# Patient Record
Sex: FEMALE | Race: BLACK OR AFRICAN AMERICAN | NOT HISPANIC OR LATINO | Employment: UNEMPLOYED | URBAN - METROPOLITAN AREA
[De-identification: names, ages, dates, MRNs, and addresses within clinical notes are randomized per-mention and may not be internally consistent; named-entity substitution may affect disease eponyms.]

---

## 2017-04-04 ENCOUNTER — HOSPITAL ENCOUNTER (EMERGENCY)
Facility: HOSPITAL | Age: 32
Discharge: HOME | End: 2017-04-04
Attending: EMERGENCY MEDICINE
Payer: MEDICAID

## 2017-04-04 VITALS
TEMPERATURE: 100 F | OXYGEN SATURATION: 99 % | RESPIRATION RATE: 20 BRPM | SYSTOLIC BLOOD PRESSURE: 105 MMHG | HEART RATE: 100 BPM | DIASTOLIC BLOOD PRESSURE: 62 MMHG

## 2017-04-04 DIAGNOSIS — K52.9 GASTROENTERITIS: Primary | ICD-10-CM

## 2017-04-04 LAB
B-HCG UR QL: NEGATIVE
BILIRUB UR QL STRIP: NEGATIVE
CLARITY UR: CLEAR
COLOR UR: YELLOW
CTP QC/QA: YES
DEPRECATED S PYO AG THROAT QL EIA: NEGATIVE
FLUAV AG SPEC QL IA: NEGATIVE
FLUBV AG SPEC QL IA: NEGATIVE
GLUCOSE UR QL STRIP: NEGATIVE
HGB UR QL STRIP: ABNORMAL
KETONES UR QL STRIP: NEGATIVE
LEUKOCYTE ESTERASE UR QL STRIP: NEGATIVE
NITRITE UR QL STRIP: NEGATIVE
PH UR STRIP: 8 [PH] (ref 5–8)
PROT UR QL STRIP: NEGATIVE
SP GR UR STRIP: 1.02 (ref 1–1.03)
SPECIMEN SOURCE: NORMAL
URN SPEC COLLECT METH UR: ABNORMAL
UROBILINOGEN UR STRIP-ACNC: 1 EU/DL

## 2017-04-04 PROCEDURE — 25000003 PHARM REV CODE 250: Performed by: PHYSICIAN ASSISTANT

## 2017-04-04 PROCEDURE — 25000003 PHARM REV CODE 250: Performed by: EMERGENCY MEDICINE

## 2017-04-04 PROCEDURE — 87880 STREP A ASSAY W/OPTIC: CPT

## 2017-04-04 PROCEDURE — 87081 CULTURE SCREEN ONLY: CPT

## 2017-04-04 PROCEDURE — 63600175 PHARM REV CODE 636 W HCPCS: Performed by: EMERGENCY MEDICINE

## 2017-04-04 PROCEDURE — 96374 THER/PROPH/DIAG INJ IV PUSH: CPT

## 2017-04-04 PROCEDURE — 96372 THER/PROPH/DIAG INJ SC/IM: CPT

## 2017-04-04 PROCEDURE — 99284 EMERGENCY DEPT VISIT MOD MDM: CPT | Mod: 25

## 2017-04-04 PROCEDURE — 87400 INFLUENZA A/B EACH AG IA: CPT

## 2017-04-04 PROCEDURE — 87147 CULTURE TYPE IMMUNOLOGIC: CPT | Mod: 59

## 2017-04-04 PROCEDURE — 81003 URINALYSIS AUTO W/O SCOPE: CPT

## 2017-04-04 RX ORDER — ONDANSETRON 4 MG/1
4 TABLET, ORALLY DISINTEGRATING ORAL
Status: COMPLETED | OUTPATIENT
Start: 2017-04-04 | End: 2017-04-04

## 2017-04-04 RX ORDER — IBUPROFEN 400 MG/1
800 TABLET ORAL
Status: DISCONTINUED | OUTPATIENT
Start: 2017-04-04 | End: 2017-04-04

## 2017-04-04 RX ORDER — ACETAMINOPHEN 500 MG
1000 TABLET ORAL
Status: COMPLETED | OUTPATIENT
Start: 2017-04-04 | End: 2017-04-04

## 2017-04-04 RX ORDER — PROCHLORPERAZINE EDISYLATE 5 MG/ML
10 INJECTION INTRAMUSCULAR; INTRAVENOUS
Status: COMPLETED | OUTPATIENT
Start: 2017-04-04 | End: 2017-04-04

## 2017-04-04 RX ORDER — KETOROLAC TROMETHAMINE 30 MG/ML
15 INJECTION, SOLUTION INTRAMUSCULAR; INTRAVENOUS
Status: COMPLETED | OUTPATIENT
Start: 2017-04-04 | End: 2017-04-04

## 2017-04-04 RX ORDER — ONDANSETRON 4 MG/1
4 TABLET, ORALLY DISINTEGRATING ORAL EVERY 6 HOURS PRN
Qty: 15 TABLET | Refills: 0 | Status: SHIPPED | OUTPATIENT
Start: 2017-04-04

## 2017-04-04 RX ADMIN — ACETAMINOPHEN 1000 MG: 500 TABLET ORAL at 05:04

## 2017-04-04 RX ADMIN — KETOROLAC TROMETHAMINE 15 MG: 30 INJECTION, SOLUTION INTRAMUSCULAR at 04:04

## 2017-04-04 RX ADMIN — PROCHLORPERAZINE EDISYLATE 10 MG: 5 INJECTION INTRAMUSCULAR; INTRAVENOUS at 04:04

## 2017-04-04 RX ADMIN — ONDANSETRON 4 MG: 4 TABLET, ORALLY DISINTEGRATING ORAL at 04:04

## 2017-04-04 NOTE — ED AVS SNAPSHOT
[facility] [address]  [place] [address]               Nicolasa Hernandez   2017  3:57 PM   ED    Description:  Female : 1985   Department:  [facility]           Your Care was Coordinated By:     Provider Role From To    Olegario Angel MD Attending Provider 17 [id number] --    Korin Lorenz PA-C Physician Assistant 17 1341 --      Reason for Visit     Abdominal Pain     Sore Throat     Generalized Body Aches           Diagnoses this Visit        Comments    Gastroenteritis    -  Primary       ED Disposition     None           To Do List           Follow-up Information     Follow up with [facility] In 3 days.    Specialties:  Child and Adolescent Psychiatry, Psychology, Family Medicine, Obstetrics    Contact information:    [address]  [address]  [place] [address]  [phone]          Please follow up.    Why:  [phone] -  [facility] appointment line        These Medications        Disp Refills Start End    ondansetron (ZOFRAN-ODT) 4 MG TbDL 15 tablet 0 2017     Take 1 tablet (4 mg total) by mouth every 6 (six) hours as needed. - Oral    Pharmacy: [facility] [place] [address] AT [facility] #: [phone]         [facility]     [facility] Nurse Care Line -  Assistance  Unless otherwise directed by your provider, please contact [facility], our nurse care line that is available for  assistance.     Registered nurses in the [facility] provide: appointment scheduling, clinical advisement, health education, and other advisory services.  Call: [phone] (toll free)               Medications           Message regarding Medications     Verify the changes and/or additions to your medication regime listed below are the same as discussed with your clinician today.  If any of these changes or  additions are incorrect, please notify your healthcare provider.        START taking these NEW medications        Refills    ondansetron (ZOFRAN-ODT) 4 MG TbDL 0    Sig: Take 1 tablet (4 mg total) by mouth every 6 (six) hours as needed.    Class: Print    Route: Oral      These medications were administered today        Dose Freq    ondansetron disintegrating tablet 4 mg 4 mg ED 1 Time    Sig: Take 1 tablet (4 mg total) by mouth ED 1 Time.    Class: Normal    Route: Oral    ketorolac injection 15 mg 15 mg ED 1 Time    Sig: Inject 15 mg into the vein ED 1 Time.    Class: Normal    Route: Intravenous    prochlorperazine injection Soln 10 mg 10 mg ED 1 Time    Sig: Inject 2 mLs (10 mg total) into the muscle ED 1 Time.    Class: Normal    Route: Intramuscular    acetaminophen tablet 1,000 mg 1,000 mg ED 1 Time    Sig: Take 2 tablets (1,000 mg total) by mouth ED 1 Time.    Class: Normal    Route: Oral    Cosign for Ordering: Required by Olegario Angel MD           Verify that the below list of medications is an accurate representation of the medications you are currently taking.  If none reported, the list may be blank. If incorrect, please contact your healthcare provider. Carry this list with you in case of emergency.           Current Medications     ibuprofen (ADVIL,MOTRIN) 200 MG tablet Take 2 tablets (400 mg total) by mouth every 6 (six) hours as needed for Pain.    ibuprofen (ADVIL,MOTRIN) 800 MG tablet Take 1 tablet (800 mg total) by mouth 3 (three) times daily.    mv-min-FA-Af-Au-zbbujrt-lutein (CENTRUM) 0.4-162-18 mg Tab Take by mouth.    ondansetron (ZOFRAN-ODT) 4 MG TbDL Take 1 tablet (4 mg total) by mouth every 6 (six) hours as needed.    oxycodone-acetaminophen (PERCOCET) 5-325 mg per tablet Take 1 tablet by mouth every 6 (six) hours as needed for Pain.    polyethylene glycol (GLYCOLAX) 17 gram/dose powder            Clinical Reference Information           Your Vitals Were     BP Pulse Temp Resp SpO2        128/72 (BP Location: Left arm, Patient Position: Sitting, BP Method: Automatic) 103 103.1 °F (39.5 °C) 20 100%       Allergies as of 4/4/2017        Reactions    Morphine Swelling      Immunizations Administered on Date of Encounter - 4/4/2017     None      ED Micro, Lab, POCT     Start Ordered       Status Ordering Provider    04/04/17 1600 04/04/17 1600  POCT urine pregnancy  Once      Acknowledged     04/04/17 1600 04/04/17 1600  Urinalysis Clean Catch  STAT      Final result     04/04/17 1600 04/04/17 1600  Rapid strep screen  STAT      Final result     04/04/17 1600 04/04/17 1600  Influenza antigen Nasal Swab  STAT      Final result     04/04/17 1600 04/04/17 1600  Strep A culture, throat  Once      In process     04/04/17 0000 04/04/17 1558  POCT urine pregnancy     Comments:  This order was created through External Result Entry    Completed       ED Imaging Orders     None      Discharge References/Attachments     VOMITING OR DIARRHEA (ADULT), DIET FOR (ENGLISH)      [facility] Sign-Up     Activating your [facility] account is as easy as 1-2-3!     1) Visit [email or web address], select Sign Up Now, enter this activation code and your date of birth, then select Next.  [email or web address]  Expires: 5/19/2017  5:29 PM      2) Create a username and password to use when you visit [facility] in the future and select a security question in case you lose your password and select Next.    3) Enter your e-mail address and click Sign Up!    Additional Information  If you have questions, please e-mail [email or web address] or call [phone] to talk to our [facility] staff. Remember, [facility] is NOT to be used for urgent needs. For medical emergencies, dial 911.          [facility] complies with applicable Federal civil rights laws and does not discriminate on the basis of race, color, national origin, age, disability, or sex.        Language Assistance Services     ATTENTION: Language assistance services  are available, free of charge. Please call [phone].      ATENCIÓN: Si habla español, tiene a fernandes disposición servicios gratuitos de asistencia lingüística. Llame al [phone].     CHÚ Ý: N?u b?n nói Ti?ng Vi?t, có các d?ch v? h? tr? ngôn ng? mi?n phí dành cho b?n. G?i s? [phone].

## 2017-04-04 NOTE — ED PROVIDER NOTES
Encounter Date: 2017       History     Chief Complaint   Patient presents with    Abdominal Pain     abd pain x 2 days + nausea     Sore Throat     with reported fever on 100.6     Generalized Body Aches     Review of patient's allergies indicates:   Allergen Reactions    Morphine Swelling     HPI Comments: Nicolasa Hernandez, a 31 y.o. female that presents to the ED for abdominal pain, nausea and vomiting x 2, sore throat and generalized body aches x 1 day.  Treatments tried include tylenol cold and flu early today with no improvement of symptoms.  She denies sick contacts.  Reported fever at home is 100.6.  Denies any diarrhea or blood in vomitus.  LBM was yesterday.  Only abdominal surgery is .        The history is provided by the patient.     Past Medical History:   Diagnosis Date    Endometriosis     Ovarian cyst      Past Surgical History:   Procedure Laterality Date     SECTION, CLASSIC      x2    OTHER SURGICAL HISTORY  tubes/ clipped    TUBAL LIGATION       Family History   Problem Relation Age of Onset    Cancer Mother      breast cancer?    Hypertension Other     Cancer Other     Diabetes Other      Social History   Substance Use Topics    Smoking status: Never Smoker    Smokeless tobacco: Never Used    Alcohol use Yes      Comment: occasional     Review of Systems   Constitutional: Positive for appetite change and fever.   HENT: Positive for sore throat. Negative for trouble swallowing and voice change.    Gastrointestinal: Positive for abdominal pain, nausea and vomiting. Negative for blood in stool, constipation and diarrhea.   Genitourinary: Negative for dysuria.   Skin: Negative for color change and rash.   Allergic/Immunologic: Negative for immunocompromised state.   Neurological: Positive for headaches. Negative for speech difficulty and numbness.   Psychiatric/Behavioral: Negative for agitation and confusion.   All other systems reviewed and are  negative.      Physical Exam   Initial Vitals   BP Pulse Resp Temp SpO2   04/04/17 1445 04/04/17 1445 -- 04/04/17 1445 04/04/17 1445   148/62 104  100.2 °F (37.9 °C) 100 %     Physical Exam    Nursing note and vitals reviewed.  Constitutional: She appears well-developed and well-nourished. No distress.   HENT:   Head: Normocephalic and atraumatic.   Right Ear: Hearing, tympanic membrane, external ear and ear canal normal.   Left Ear: Hearing, tympanic membrane, external ear and ear canal normal.   Mouth/Throat: Uvula is midline, oropharynx is clear and moist and mucous membranes are normal.   Eyes: Conjunctivae and EOM are normal.   Neck: Normal range of motion. No tracheal deviation present.   Cardiovascular: Normal rate and regular rhythm.   Pulmonary/Chest: Breath sounds normal. No respiratory distress. She has no wheezes. She has no rhonchi. She has no rales.   Abdominal: Soft. Bowel sounds are normal. She exhibits no distension. There is generalized tenderness. There is no rigidity, no rebound, no guarding, no CVA tenderness, no tenderness at McBurney's point and negative Villalba's sign.   Musculoskeletal: Normal range of motion. She exhibits no tenderness.   Neurological: She is alert and oriented to person, place, and time. She has normal strength.   Skin: Skin is warm and dry. No rash noted. No erythema.   Psychiatric: She has a normal mood and affect. Thought content normal.         ED Course   Procedures  Labs Reviewed   URINALYSIS - Abnormal; Notable for the following:        Result Value    Occult Blood UA Trace (*)     All other components within normal limits   THROAT SCREEN, RAPID   CULTURE, STREP A,  THROAT   INFLUENZA A AND B ANTIGEN   POCT URINE PREGNANCY             Medical Decision Making:   Initial Assessment:   Abdominal pain, N/V, generalized body aches, sore throat, headache  Differential Diagnosis:   Gastroenteritis, influenza, strept, viral URI   Clinical Tests:   Lab Tests: Ordered and  Reviewed  The following lab test(s) were unremarkable: Urinalysis and UPT  ED Management:  Zofran, toradol and compazine given in ED with improvement of body aches, headache and nausea.  PO challenge passed.  Influenza and strept negative.  UA showed no evidence of UTI.  Fever reached 103, tylenol given with reduction of fever.  Patient was encouraged to increase hydration. She was given information on fever control with tylenol and motrin as well as advancing diet.  Strict return precautions given and patient verbalized understanding.    RX: Zofran                 Attending Attestation:     Physician Attestation Statement for NP/PA:   I have conducted a face to face encounter with this patient in addition to the NP/PA, due to NP/PA Request    Other NP/PA Attestation Additions:    History of Present Illness: Agree; 31-year-old female presents to the emergency department complaining of abdominal pain, nausea, 2 episodes of nonbloody, nonbilious emesis, sore throat, myalgias.  No relief with Tylenol Cold and flu, MAXIMUM TEMPERATURE at home 100.6.  No diarrhea.   Physical Exam: Agree   Medical Decision Making: Agree; patient given symptomatically treatment and feeling much better at this time.  Discussed disposition including discharge with symptomatically management, follow up with primary care physician, reasons return to the emergency room.                 ED Course     Clinical Impression:   The encounter diagnosis was Gastroenteritis.          Korin Lorenz PA-C  04/04/17 1801       Olegario Angel MD  04/14/17 3703

## 2017-04-05 LAB
BACTERIA THROAT CULT: NORMAL
BACTERIA THROAT CULT: NORMAL

## 2019-07-15 ENCOUNTER — HOSPITAL ENCOUNTER (EMERGENCY)
Facility: HOSPITAL | Age: 34
Discharge: HOME | End: 2019-07-16
Attending: EMERGENCY MEDICINE

## 2019-07-15 DIAGNOSIS — R11.0 NAUSEA: Primary | ICD-10-CM

## 2019-07-15 DIAGNOSIS — R07.9 CHEST PAIN: ICD-10-CM

## 2019-07-15 LAB
B-HCG UR QL: NEGATIVE
BACTERIA #/AREA URNS AUTO: NORMAL /HPF
BILIRUB UR QL STRIP: NEGATIVE
CLARITY UR REFRACT.AUTO: CLEAR
COLOR UR AUTO: ABNORMAL
CTP QC/QA: YES
GLUCOSE UR QL STRIP: NEGATIVE
HGB UR QL STRIP: ABNORMAL
KETONES UR QL STRIP: NEGATIVE
LEUKOCYTE ESTERASE UR QL STRIP: NEGATIVE
MICROSCOPIC COMMENT: NORMAL
NITRITE UR QL STRIP: NEGATIVE
PH UR STRIP: 8 [PH] (ref 5–8)
PROT UR QL STRIP: NEGATIVE
RBC #/AREA URNS AUTO: 4 /HPF (ref 0–4)
SP GR UR STRIP: 1.01 (ref 1–1.03)
SQUAMOUS #/AREA URNS AUTO: 1 /HPF
URN SPEC COLLECT METH UR: ABNORMAL
WBC #/AREA URNS AUTO: 0 /HPF (ref 0–5)

## 2019-07-15 PROCEDURE — 99284 EMERGENCY DEPT VISIT MOD MDM: CPT | Mod: 25

## 2019-07-15 PROCEDURE — 81001 URINALYSIS AUTO W/SCOPE: CPT

## 2019-07-15 PROCEDURE — 99284 EMERGENCY DEPT VISIT MOD MDM: CPT | Mod: ,,, | Performed by: EMERGENCY MEDICINE

## 2019-07-15 PROCEDURE — 96361 HYDRATE IV INFUSION ADD-ON: CPT

## 2019-07-15 PROCEDURE — 85025 COMPLETE CBC W/AUTO DIFF WBC: CPT

## 2019-07-15 PROCEDURE — 81025 URINE PREGNANCY TEST: CPT | Performed by: EMERGENCY MEDICINE

## 2019-07-15 PROCEDURE — 93005 ELECTROCARDIOGRAM TRACING: CPT

## 2019-07-15 PROCEDURE — 63600175 PHARM REV CODE 636 W HCPCS: Performed by: EMERGENCY MEDICINE

## 2019-07-15 PROCEDURE — 93010 EKG 12-LEAD: ICD-10-PCS | Mod: ,,, | Performed by: INTERNAL MEDICINE

## 2019-07-15 PROCEDURE — 99284 PR EMERGENCY DEPT VISIT,LEVEL IV: ICD-10-PCS | Mod: ,,, | Performed by: EMERGENCY MEDICINE

## 2019-07-15 PROCEDURE — 96372 THER/PROPH/DIAG INJ SC/IM: CPT

## 2019-07-15 PROCEDURE — 25000003 PHARM REV CODE 250: Performed by: EMERGENCY MEDICINE

## 2019-07-15 PROCEDURE — 96374 THER/PROPH/DIAG INJ IV PUSH: CPT

## 2019-07-15 PROCEDURE — 83690 ASSAY OF LIPASE: CPT

## 2019-07-15 PROCEDURE — 80053 COMPREHEN METABOLIC PANEL: CPT

## 2019-07-15 PROCEDURE — 93010 ELECTROCARDIOGRAM REPORT: CPT | Mod: ,,, | Performed by: INTERNAL MEDICINE

## 2019-07-15 RX ORDER — ONDANSETRON 2 MG/ML
8 INJECTION INTRAMUSCULAR; INTRAVENOUS
Status: COMPLETED | OUTPATIENT
Start: 2019-07-15 | End: 2019-07-15

## 2019-07-15 RX ORDER — ONDANSETRON 4 MG/1
4 TABLET, ORALLY DISINTEGRATING ORAL
Status: COMPLETED | OUTPATIENT
Start: 2019-07-15 | End: 2019-07-15

## 2019-07-15 RX ORDER — MAG HYDROX/ALUMINUM HYD/SIMETH 200-200-20
5 SUSPENSION, ORAL (FINAL DOSE FORM) ORAL
Status: COMPLETED | OUTPATIENT
Start: 2019-07-15 | End: 2019-07-15

## 2019-07-15 RX ORDER — DICYCLOMINE HYDROCHLORIDE 10 MG/1
20 CAPSULE ORAL
Status: COMPLETED | OUTPATIENT
Start: 2019-07-15 | End: 2019-07-15

## 2019-07-15 RX ADMIN — ONDANSETRON 4 MG: 4 TABLET, ORALLY DISINTEGRATING ORAL at 10:07

## 2019-07-15 RX ADMIN — SODIUM CHLORIDE 1000 ML: 0.9 INJECTION, SOLUTION INTRAVENOUS at 11:07

## 2019-07-15 RX ADMIN — DICYCLOMINE HYDROCHLORIDE 20 MG: 10 CAPSULE ORAL at 10:07

## 2019-07-15 RX ADMIN — ONDANSETRON 8 MG: 2 INJECTION INTRAMUSCULAR; INTRAVENOUS at 11:07

## 2019-07-15 RX ADMIN — ALUMINUM HYDROXIDE, MAGNESIUM HYDROXIDE, AND SIMETHICONE 5 ML: 200; 200; 20 SUSPENSION ORAL at 10:07

## 2019-07-16 VITALS
BODY MASS INDEX: 36.96 KG/M2 | OXYGEN SATURATION: 99 % | DIASTOLIC BLOOD PRESSURE: 73 MMHG | HEIGHT: 66 IN | WEIGHT: 230 LBS | HEART RATE: 67 BPM | RESPIRATION RATE: 22 BRPM | TEMPERATURE: 99 F | SYSTOLIC BLOOD PRESSURE: 132 MMHG

## 2019-07-16 LAB
ALBUMIN SERPL BCP-MCNC: 3.8 G/DL (ref 3.5–5.2)
ALP SERPL-CCNC: 59 U/L (ref 55–135)
ALT SERPL W/O P-5'-P-CCNC: 21 U/L (ref 10–44)
ANION GAP SERPL CALC-SCNC: 6 MMOL/L (ref 8–16)
AST SERPL-CCNC: 25 U/L (ref 10–40)
BASOPHILS # BLD AUTO: 0.03 K/UL (ref 0–0.2)
BASOPHILS NFR BLD: 0.4 % (ref 0–1.9)
BILIRUB SERPL-MCNC: 0.4 MG/DL (ref 0.1–1)
BUN SERPL-MCNC: 7 MG/DL (ref 6–20)
CALCIUM SERPL-MCNC: 9.6 MG/DL (ref 8.7–10.5)
CHLORIDE SERPL-SCNC: 104 MMOL/L (ref 95–110)
CO2 SERPL-SCNC: 29 MMOL/L (ref 23–29)
CREAT SERPL-MCNC: 0.9 MG/DL (ref 0.5–1.4)
DIFFERENTIAL METHOD: ABNORMAL
EOSINOPHIL # BLD AUTO: 0.1 K/UL (ref 0–0.5)
EOSINOPHIL NFR BLD: 0.8 % (ref 0–8)
ERYTHROCYTE [DISTWIDTH] IN BLOOD BY AUTOMATED COUNT: 13.8 % (ref 11.5–14.5)
EST. GFR  (AFRICAN AMERICAN): >60 ML/MIN/1.73 M^2
EST. GFR  (NON AFRICAN AMERICAN): >60 ML/MIN/1.73 M^2
GLUCOSE SERPL-MCNC: 102 MG/DL (ref 70–110)
HCT VFR BLD AUTO: 31.8 % (ref 37–48.5)
HGB BLD-MCNC: 10.4 G/DL (ref 12–16)
IMM GRANULOCYTES # BLD AUTO: 0.02 K/UL (ref 0–0.04)
IMM GRANULOCYTES NFR BLD AUTO: 0.3 % (ref 0–0.5)
LIPASE SERPL-CCNC: 18 U/L (ref 4–60)
LYMPHOCYTES # BLD AUTO: 2.7 K/UL (ref 1–4.8)
LYMPHOCYTES NFR BLD: 33.8 % (ref 18–48)
MCH RBC QN AUTO: 25.6 PG (ref 27–31)
MCHC RBC AUTO-ENTMCNC: 32.7 G/DL (ref 32–36)
MCV RBC AUTO: 78 FL (ref 82–98)
MONOCYTES # BLD AUTO: 0.5 K/UL (ref 0.3–1)
MONOCYTES NFR BLD: 6 % (ref 4–15)
NEUTROPHILS # BLD AUTO: 4.7 K/UL (ref 1.8–7.7)
NEUTROPHILS NFR BLD: 58.7 % (ref 38–73)
NRBC BLD-RTO: 0 /100 WBC
PLATELET # BLD AUTO: 264 K/UL (ref 150–350)
PMV BLD AUTO: 11.1 FL (ref 9.2–12.9)
POTASSIUM SERPL-SCNC: 3.5 MMOL/L (ref 3.5–5.1)
PROT SERPL-MCNC: 7.7 G/DL (ref 6–8.4)
RBC # BLD AUTO: 4.07 M/UL (ref 4–5.4)
SODIUM SERPL-SCNC: 139 MMOL/L (ref 136–145)
WBC # BLD AUTO: 7.97 K/UL (ref 3.9–12.7)

## 2019-07-16 PROCEDURE — 25000003 PHARM REV CODE 250: Performed by: EMERGENCY MEDICINE

## 2019-07-16 PROCEDURE — S0028 INJECTION, FAMOTIDINE, 20 MG: HCPCS | Performed by: EMERGENCY MEDICINE

## 2019-07-16 RX ORDER — DICYCLOMINE HYDROCHLORIDE 10 MG/1
20 CAPSULE ORAL
Status: COMPLETED | OUTPATIENT
Start: 2019-07-16 | End: 2019-07-16

## 2019-07-16 RX ORDER — FAMOTIDINE 10 MG/ML
20 INJECTION INTRAVENOUS
Status: COMPLETED | OUTPATIENT
Start: 2019-07-16 | End: 2019-07-16

## 2019-07-16 RX ORDER — MAG HYDROX/ALUMINUM HYD/SIMETH 200-200-20
5 SUSPENSION, ORAL (FINAL DOSE FORM) ORAL
Status: COMPLETED | OUTPATIENT
Start: 2019-07-16 | End: 2019-07-16

## 2019-07-16 RX ADMIN — ALUMINUM HYDROXIDE, MAGNESIUM HYDROXIDE, AND SIMETHICONE 5 ML: 200; 200; 20 SUSPENSION ORAL at 01:07

## 2019-07-16 RX ADMIN — FAMOTIDINE 20 MG: 10 INJECTION, SOLUTION INTRAVENOUS at 01:07

## 2019-07-16 RX ADMIN — DICYCLOMINE HYDROCHLORIDE 20 MG: 10 CAPSULE ORAL at 01:07

## 2019-07-16 NOTE — ED PROVIDER NOTES
Encounter Date: 7/15/2019    SCRIBE #1 NOTE: I, Brea Edwards, am scribing for, and in the presence of,  Dr. Orozco. I have scribed the entire note.       History     Chief Complaint   Patient presents with    Emesis     started today this AM vomited approx 5 times, reports streaks of blood in vomit. reports burning feeling in her chest and stomach, denies nausea at present, denies diarrhea reports taking enema today. currently taking cipro for UTI and took first dose today after vomiting     33 y.o. female currently taking Cipro for UTI with medical conditions including ovarian cyst and endometrioses, who presents with complaint of abdominal pain and emesis. Patient states she has been experiencing non radiating generalized abdominal pain for the past few days. Patient abdominal pain worsens when lying flat. Patient went to [facility] and received a CAT scan where she was diagnosed with bladder infection and kidney stone and given medications but she has not had any relief. Patient reports new onset of vomiting this morning, stating she had approximately 5 times. Patient states she was prescribed nausea medications at discharge from [facility] but she was unable to get them secondary to social issues. Patient denies fever, chills, back pain, cp, SOB, or rash.     The history is provided by the patient.     Review of patient's allergies indicates:   Allergen Reactions    Morphine Swelling     Past Medical History:   Diagnosis Date    Endometriosis     Ovarian cyst      Past Surgical History:   Procedure Laterality Date     SECTION, CLASSIC      x2    OTHER SURGICAL HISTORY  tubes/ clipped    TUBAL LIGATION       Family History   Problem Relation Age of Onset    Cancer Mother         breast cancer?    Hypertension Other     Cancer Other     Diabetes Other      Social History     Tobacco Use    Smoking status: Never Smoker    Smokeless tobacco: Never Used   Substance Use Topics     Alcohol use: Yes     Comment: occasional    Drug use: No     Review of Systems   Constitutional: Negative for chills and fever.   HENT: Negative for rhinorrhea and sore throat.    Eyes: Negative for visual disturbance.   Respiratory: Negative for cough and shortness of breath.    Cardiovascular: Negative for chest pain.   Gastrointestinal: Positive for abdominal pain, nausea and vomiting.   Genitourinary: Negative for difficulty urinating and dysuria.   Musculoskeletal: Negative for back pain and neck pain.   Skin: Negative for rash.   Neurological: Negative for weakness and numbness.       Physical Exam     Initial Vitals [07/15/19 2025]   BP Pulse Resp Temp SpO2   135/84 76 (!) 22 98.2 °F (36.8 °C) 100 %      MAP       --         Physical Exam    Nursing note and vitals reviewed.  Constitutional: She appears well-developed and well-nourished.   Initially patient is sleeping and snoring but is easily arousable.    HENT:   Head: Normocephalic and atraumatic.   Mouth/Throat: Oropharynx is clear and moist.   Eyes: Pupils are equal, round, and reactive to light.   Neck: Normal range of motion.   Cardiovascular: Normal rate and regular rhythm.   Pulmonary/Chest: Breath sounds normal.   Abdominal: Soft. There is no tenderness. There is no rebound and no guarding.   Neurological: She is alert and oriented to person, place, and time.   Skin: Skin is warm and dry.         ED Course   Procedures  Labs Reviewed   URINALYSIS, REFLEX TO URINE CULTURE - Abnormal; Notable for the following components:       Result Value    Color, UA Red (*)     Occult Blood UA 1+ (*)     All other components within normal limits    Narrative:     Preferred Collection Type->Urine, Clean Catch  yellow and grey   COMPREHENSIVE METABOLIC PANEL - Abnormal; Notable for the following components:    Anion Gap 6 (*)     All other components within normal limits   CBC W/ AUTO DIFFERENTIAL - Abnormal; Notable for the following components:    Hemoglobin  10.4 (*)     Hematocrit 31.8 (*)     Mean Corpuscular Volume 78 (*)     Mean Corpuscular Hemoglobin 25.6 (*)     All other components within normal limits   URINALYSIS MICROSCOPIC    Narrative:     Preferred Collection Type->Urine, Clean Catch  yellow and grey   LIPASE   POCT URINE PREGNANCY     EKG Readings: (Independently Interpreted)   Rhythm: Normal Sinus Rhythm. Heart Rate: 81.     ECG Results          EKG 12-lead (In process)  Result time 07/16/19 07:45:39    In process by Interface, Lab In [facility] (07/16/19 07:45:39)                 Narrative:    Test Reason : R07.9,    Vent. Rate : 091 BPM     Atrial Rate : 091 BPM     P-R Int : 154 ms          QRS Dur : 076 ms      QT Int : 368 ms       P-R-T Axes : 049 039 033 degrees     QTc Int : 452 ms    Normal sinus rhythm  Nonspecific T wave abnormality  Abnormal ECG  When compared with ECG of 02-JUN-2014 13:01,  No significant change was found    Referred By: [email or web address]   SELF           Confirmed By:                             Imaging Results    None          Medical Decision Making:   History:   Old Medical Records: I decided to obtain old medical records.  Initial Assessment:   33 y.o.female who is coming in with complaint of nausea and vomiting today. She is also complaining of a diffused burning sensation in her abdomen since yesterday. Patient self reports being diagnosed with a kidney stone in her bladder and a bladder infection on a CT yesterday at [facility]. She was prescribed Toradol, Flomax, Cipro, and Zofran. Patient was told to take all of these medications but she did not take Zofran because she states it was too expensive. Patient is very well appearing. I initially was not going to repeat labs but patient vomited in the ED, Non bloodu or bilious. Will order basic labs and a GI cocktail once patient is able to tolerate PO. Symptoms are likely secondary to pain from stone and does have a need for antiemetics and she will get medications filled.  "  Independently Interpreted Test(s):   I have ordered and independently interpreted EKG Reading(s) - see prior notes  Clinical Tests:   Lab Tests: Ordered and Reviewed  Medical Tests: Ordered and Reviewed  ED Management:  Patient given antiemetics in the ED, reports that following antiemetics and Pepcid she is feeling "much better." Tolerating po.  I have discussed with the patient the need to get antiemetic medication.  I have explained her that I think she needs to find out how much it will cost her to only get a few days worth of tablets as opposed to a large supply which I think is the issue.  She is with her mother she understands and agrees and she does come to the ER at any time if there is any issue with this.  Patient is smiling and laughing at discharge is very well-appearing            Scribe Attestation:   Scribe #1: I performed the above scribed service and the documentation accurately describes the services I performed. I attest to the accuracy of the note.               Clinical Impression:     Nausea                             Sejal Orozco MD  07/16/19 1907    "

## 2019-07-17 ENCOUNTER — ANESTHESIA EVENT (OUTPATIENT)
Dept: SURGERY | Facility: HOSPITAL | Age: 34
End: 2019-07-17

## 2019-07-17 ENCOUNTER — HOSPITAL ENCOUNTER (OUTPATIENT)
Facility: HOSPITAL | Age: 34
Discharge: HOME | End: 2019-07-18
Attending: EMERGENCY MEDICINE | Admitting: SURGERY

## 2019-07-17 ENCOUNTER — ANESTHESIA (OUTPATIENT)
Dept: SURGERY | Facility: HOSPITAL | Age: 34
End: 2019-07-17

## 2019-07-17 DIAGNOSIS — K80.20 CHOLELITHIASIS: ICD-10-CM

## 2019-07-17 DIAGNOSIS — R10.13 EPIGASTRIC PAIN: ICD-10-CM

## 2019-07-17 DIAGNOSIS — K80.00 CALCULUS OF GALLBLADDER WITH ACUTE CHOLECYSTITIS WITHOUT OBSTRUCTION: Primary | ICD-10-CM

## 2019-07-17 DIAGNOSIS — R10.9 ABDOMINAL PAIN: ICD-10-CM

## 2019-07-17 LAB
ALBUMIN SERPL BCP-MCNC: 3.7 G/DL (ref 3.5–5.2)
ALP SERPL-CCNC: 61 U/L (ref 55–135)
ALT SERPL W/O P-5'-P-CCNC: 41 U/L (ref 10–44)
ANION GAP SERPL CALC-SCNC: 10 MMOL/L (ref 8–16)
AST SERPL-CCNC: 50 U/L (ref 10–40)
BASOPHILS # BLD AUTO: 0.03 K/UL (ref 0–0.2)
BASOPHILS NFR BLD: 0.4 % (ref 0–1.9)
BILIRUB SERPL-MCNC: 0.5 MG/DL (ref 0.1–1)
BUN SERPL-MCNC: 6 MG/DL (ref 6–20)
CALCIUM SERPL-MCNC: 9.2 MG/DL (ref 8.7–10.5)
CHLORIDE SERPL-SCNC: 105 MMOL/L (ref 95–110)
CO2 SERPL-SCNC: 24 MMOL/L (ref 23–29)
CREAT SERPL-MCNC: 0.9 MG/DL (ref 0.5–1.4)
DIFFERENTIAL METHOD: ABNORMAL
EOSINOPHIL # BLD AUTO: 0.1 K/UL (ref 0–0.5)
EOSINOPHIL NFR BLD: 1.2 % (ref 0–8)
ERYTHROCYTE [DISTWIDTH] IN BLOOD BY AUTOMATED COUNT: 13.9 % (ref 11.5–14.5)
EST. GFR  (AFRICAN AMERICAN): >60 ML/MIN/1.73 M^2
EST. GFR  (NON AFRICAN AMERICAN): >60 ML/MIN/1.73 M^2
GLUCOSE SERPL-MCNC: 98 MG/DL (ref 70–110)
HCT VFR BLD AUTO: 32.8 % (ref 37–48.5)
HGB BLD-MCNC: 10.6 G/DL (ref 12–16)
IMM GRANULOCYTES # BLD AUTO: 0.01 K/UL (ref 0–0.04)
IMM GRANULOCYTES NFR BLD AUTO: 0.1 % (ref 0–0.5)
LIPASE SERPL-CCNC: 16 U/L (ref 4–60)
LYMPHOCYTES # BLD AUTO: 1.9 K/UL (ref 1–4.8)
LYMPHOCYTES NFR BLD: 27.3 % (ref 18–48)
MAGNESIUM SERPL-MCNC: 1.8 MG/DL (ref 1.6–2.6)
MCH RBC QN AUTO: 25.2 PG (ref 27–31)
MCHC RBC AUTO-ENTMCNC: 32.3 G/DL (ref 32–36)
MCV RBC AUTO: 78 FL (ref 82–98)
MONOCYTES # BLD AUTO: 0.5 K/UL (ref 0.3–1)
MONOCYTES NFR BLD: 7.1 % (ref 4–15)
NEUTROPHILS # BLD AUTO: 4.4 K/UL (ref 1.8–7.7)
NEUTROPHILS NFR BLD: 63.9 % (ref 38–73)
NRBC BLD-RTO: 0 /100 WBC
PLATELET # BLD AUTO: 280 K/UL (ref 150–350)
PMV BLD AUTO: 10.9 FL (ref 9.2–12.9)
POTASSIUM SERPL-SCNC: 3.5 MMOL/L (ref 3.5–5.1)
PROT SERPL-MCNC: 7.8 G/DL (ref 6–8.4)
RBC # BLD AUTO: 4.2 M/UL (ref 4–5.4)
SODIUM SERPL-SCNC: 139 MMOL/L (ref 136–145)
WBC # BLD AUTO: 6.92 K/UL (ref 3.9–12.7)

## 2019-07-17 PROCEDURE — 63600175 PHARM REV CODE 636 W HCPCS: Performed by: STUDENT IN AN ORGANIZED HEALTH CARE EDUCATION/TRAINING PROGRAM

## 2019-07-17 PROCEDURE — 37000009 HC ANESTHESIA EA ADD 15 MINS: Performed by: SURGERY

## 2019-07-17 PROCEDURE — 99219 PR INITIAL OBSERVATION CARE,LEVL II: ICD-10-PCS | Mod: 57,,, | Performed by: SURGERY

## 2019-07-17 PROCEDURE — 47562 PR LAP,CHOLECYSTECTOMY: ICD-10-PCS | Mod: ,,, | Performed by: SURGERY

## 2019-07-17 PROCEDURE — 80053 COMPREHEN METABOLIC PANEL: CPT

## 2019-07-17 PROCEDURE — 71000015 HC POSTOP RECOV 1ST HR: Performed by: SURGERY

## 2019-07-17 PROCEDURE — G0378 HOSPITAL OBSERVATION PER HR: HCPCS

## 2019-07-17 PROCEDURE — 25000003 PHARM REV CODE 250: Performed by: STUDENT IN AN ORGANIZED HEALTH CARE EDUCATION/TRAINING PROGRAM

## 2019-07-17 PROCEDURE — 47562 LAPAROSCOPIC CHOLECYSTECTOMY: CPT | Mod: ,,, | Performed by: SURGERY

## 2019-07-17 PROCEDURE — S0030 INJECTION, METRONIDAZOLE: HCPCS | Performed by: STUDENT IN AN ORGANIZED HEALTH CARE EDUCATION/TRAINING PROGRAM

## 2019-07-17 PROCEDURE — S0020 INJECTION, BUPIVICAINE HYDRO: HCPCS | Performed by: SURGERY

## 2019-07-17 PROCEDURE — 96374 THER/PROPH/DIAG INJ IV PUSH: CPT

## 2019-07-17 PROCEDURE — 83690 ASSAY OF LIPASE: CPT

## 2019-07-17 PROCEDURE — 25000003 PHARM REV CODE 250: Performed by: PHYSICIAN ASSISTANT

## 2019-07-17 PROCEDURE — 36000709 HC OR TIME LEV III EA ADD 15 MIN: Performed by: SURGERY

## 2019-07-17 PROCEDURE — 88304 TISSUE SPECIMEN TO PATHOLOGY - SURGERY: ICD-10-PCS | Mod: 26,,, | Performed by: PATHOLOGY

## 2019-07-17 PROCEDURE — 99284 EMERGENCY DEPT VISIT MOD MDM: CPT | Mod: ,,, | Performed by: PHYSICIAN ASSISTANT

## 2019-07-17 PROCEDURE — 93010 ELECTROCARDIOGRAM REPORT: CPT | Mod: ,,, | Performed by: INTERNAL MEDICINE

## 2019-07-17 PROCEDURE — 88304 TISSUE EXAM BY PATHOLOGIST: CPT | Performed by: PATHOLOGY

## 2019-07-17 PROCEDURE — 99285 EMERGENCY DEPT VISIT HI MDM: CPT | Mod: 25

## 2019-07-17 PROCEDURE — 71000044 HC DOSC ROUTINE RECOVERY FIRST HOUR: Performed by: SURGERY

## 2019-07-17 PROCEDURE — 25000003 PHARM REV CODE 250: Performed by: NURSE ANESTHETIST, CERTIFIED REGISTERED

## 2019-07-17 PROCEDURE — 63600175 PHARM REV CODE 636 W HCPCS: Performed by: PHYSICIAN ASSISTANT

## 2019-07-17 PROCEDURE — 88304 TISSUE EXAM BY PATHOLOGIST: CPT | Mod: 26,,, | Performed by: PATHOLOGY

## 2019-07-17 PROCEDURE — 99284 PR EMERGENCY DEPT VISIT,LEVEL IV: ICD-10-PCS | Mod: ,,, | Performed by: PHYSICIAN ASSISTANT

## 2019-07-17 PROCEDURE — 96375 TX/PRO/DX INJ NEW DRUG ADDON: CPT

## 2019-07-17 PROCEDURE — 93010 EKG 12-LEAD: ICD-10-PCS | Mod: ,,, | Performed by: INTERNAL MEDICINE

## 2019-07-17 PROCEDURE — 83735 ASSAY OF MAGNESIUM: CPT

## 2019-07-17 PROCEDURE — 27201423 OPTIME MED/SURG SUP & DEVICES STERILE SUPPLY: Performed by: SURGERY

## 2019-07-17 PROCEDURE — 37000008 HC ANESTHESIA 1ST 15 MINUTES: Performed by: SURGERY

## 2019-07-17 PROCEDURE — 99219 PR INITIAL OBSERVATION CARE,LEVL II: CPT | Mod: 57,,, | Performed by: SURGERY

## 2019-07-17 PROCEDURE — 36000708 HC OR TIME LEV III 1ST 15 MIN: Performed by: SURGERY

## 2019-07-17 PROCEDURE — 93005 ELECTROCARDIOGRAM TRACING: CPT

## 2019-07-17 PROCEDURE — 85025 COMPLETE CBC W/AUTO DIFF WBC: CPT

## 2019-07-17 PROCEDURE — S0028 INJECTION, FAMOTIDINE, 20 MG: HCPCS | Performed by: PHYSICIAN ASSISTANT

## 2019-07-17 PROCEDURE — D9220A PRA ANESTHESIA: ICD-10-PCS | Mod: ,,, | Performed by: ANESTHESIOLOGY

## 2019-07-17 PROCEDURE — D9220A PRA ANESTHESIA: Mod: ,,, | Performed by: ANESTHESIOLOGY

## 2019-07-17 PROCEDURE — 63600175 PHARM REV CODE 636 W HCPCS: Performed by: NURSE ANESTHETIST, CERTIFIED REGISTERED

## 2019-07-17 PROCEDURE — 25000003 PHARM REV CODE 250: Performed by: SURGERY

## 2019-07-17 RX ORDER — SUCCINYLCHOLINE CHLORIDE 20 MG/ML
INJECTION INTRAMUSCULAR; INTRAVENOUS
Status: DISCONTINUED | OUTPATIENT
Start: 2019-07-17 | End: 2019-07-17

## 2019-07-17 RX ORDER — ONDANSETRON 8 MG/1
8 TABLET, ORALLY DISINTEGRATING ORAL EVERY 6 HOURS PRN
Status: DISCONTINUED | OUTPATIENT
Start: 2019-07-17 | End: 2019-07-18 | Stop reason: HOSPADM

## 2019-07-17 RX ORDER — ONDANSETRON 2 MG/ML
4 INJECTION INTRAMUSCULAR; INTRAVENOUS DAILY PRN
Status: DISCONTINUED | OUTPATIENT
Start: 2019-07-17 | End: 2019-07-18 | Stop reason: HOSPADM

## 2019-07-17 RX ORDER — BUPIVACAINE HYDROCHLORIDE 5 MG/ML
INJECTION, SOLUTION EPIDURAL; INTRACAUDAL
Status: DISCONTINUED | OUTPATIENT
Start: 2019-07-17 | End: 2019-07-17 | Stop reason: HOSPADM

## 2019-07-17 RX ORDER — CIPROFLOXACIN 500 MG/1
500 TABLET ORAL
COMMUNITY

## 2019-07-17 RX ORDER — LIDOCAINE HCL/PF 100 MG/5ML
SYRINGE (ML) INTRAVENOUS
Status: DISCONTINUED | OUTPATIENT
Start: 2019-07-17 | End: 2019-07-17

## 2019-07-17 RX ORDER — HYDROMORPHONE HYDROCHLORIDE 1 MG/ML
0.5 INJECTION, SOLUTION INTRAMUSCULAR; INTRAVENOUS; SUBCUTANEOUS EVERY 6 HOURS PRN
Status: DISCONTINUED | OUTPATIENT
Start: 2019-07-17 | End: 2019-07-18 | Stop reason: HOSPADM

## 2019-07-17 RX ORDER — ONDANSETRON 2 MG/ML
4 INJECTION INTRAMUSCULAR; INTRAVENOUS ONCE AS NEEDED
Status: DISCONTINUED | OUTPATIENT
Start: 2019-07-17 | End: 2019-07-18 | Stop reason: HOSPADM

## 2019-07-17 RX ORDER — HYDROMORPHONE HYDROCHLORIDE 1 MG/ML
0.5 INJECTION, SOLUTION INTRAMUSCULAR; INTRAVENOUS; SUBCUTANEOUS
Status: ACTIVE | OUTPATIENT
Start: 2019-07-17 | End: 2019-07-17

## 2019-07-17 RX ORDER — FAMOTIDINE 10 MG/ML
20 INJECTION INTRAVENOUS
Status: DISCONTINUED | OUTPATIENT
Start: 2019-07-17 | End: 2019-07-17

## 2019-07-17 RX ORDER — CEFAZOLIN SODIUM 1 G/3ML
INJECTION, POWDER, FOR SOLUTION INTRAMUSCULAR; INTRAVENOUS
Status: DISCONTINUED | OUTPATIENT
Start: 2019-07-17 | End: 2019-07-17

## 2019-07-17 RX ORDER — SODIUM CHLORIDE 0.9 % (FLUSH) 0.9 %
10 SYRINGE (ML) INJECTION
Status: DISCONTINUED | OUTPATIENT
Start: 2019-07-17 | End: 2019-07-18 | Stop reason: HOSPADM

## 2019-07-17 RX ORDER — ROCURONIUM BROMIDE 10 MG/ML
INJECTION, SOLUTION INTRAVENOUS
Status: DISCONTINUED | OUTPATIENT
Start: 2019-07-17 | End: 2019-07-17

## 2019-07-17 RX ORDER — ACETAMINOPHEN 325 MG/1
650 TABLET ORAL EVERY 8 HOURS PRN
Status: DISCONTINUED | OUTPATIENT
Start: 2019-07-17 | End: 2019-07-18 | Stop reason: HOSPADM

## 2019-07-17 RX ORDER — FENTANYL CITRATE 50 UG/ML
25 INJECTION, SOLUTION INTRAMUSCULAR; INTRAVENOUS EVERY 5 MIN PRN
Status: DISCONTINUED | OUTPATIENT
Start: 2019-07-17 | End: 2019-07-18 | Stop reason: HOSPADM

## 2019-07-17 RX ORDER — FENTANYL CITRATE 50 UG/ML
INJECTION, SOLUTION INTRAMUSCULAR; INTRAVENOUS
Status: DISCONTINUED | OUTPATIENT
Start: 2019-07-17 | End: 2019-07-17

## 2019-07-17 RX ORDER — ACETAMINOPHEN 325 MG/1
650 TABLET ORAL EVERY 4 HOURS PRN
Status: DISCONTINUED | OUTPATIENT
Start: 2019-07-17 | End: 2019-07-18 | Stop reason: HOSPADM

## 2019-07-17 RX ORDER — PROPOFOL 10 MG/ML
VIAL (ML) INTRAVENOUS
Status: DISCONTINUED | OUTPATIENT
Start: 2019-07-17 | End: 2019-07-17

## 2019-07-17 RX ORDER — PHENYLEPHRINE HYDROCHLORIDE 10 MG/ML
INJECTION INTRAVENOUS
Status: DISCONTINUED | OUTPATIENT
Start: 2019-07-17 | End: 2019-07-17

## 2019-07-17 RX ORDER — METRONIDAZOLE 500 MG/100ML
500 INJECTION, SOLUTION INTRAVENOUS
Status: DISCONTINUED | OUTPATIENT
Start: 2019-07-17 | End: 2019-07-17

## 2019-07-17 RX ORDER — SODIUM CHLORIDE 9 MG/ML
INJECTION, SOLUTION INTRAVENOUS CONTINUOUS PRN
Status: DISCONTINUED | OUTPATIENT
Start: 2019-07-17 | End: 2019-07-17

## 2019-07-17 RX ORDER — MIDAZOLAM HYDROCHLORIDE 1 MG/ML
INJECTION, SOLUTION INTRAMUSCULAR; INTRAVENOUS
Status: DISCONTINUED | OUTPATIENT
Start: 2019-07-17 | End: 2019-07-17

## 2019-07-17 RX ORDER — DEXAMETHASONE SODIUM PHOSPHATE 4 MG/ML
INJECTION, SOLUTION INTRA-ARTICULAR; INTRALESIONAL; INTRAMUSCULAR; INTRAVENOUS; SOFT TISSUE
Status: DISCONTINUED | OUTPATIENT
Start: 2019-07-17 | End: 2019-07-17

## 2019-07-17 RX ORDER — HYDROCODONE BITARTRATE AND ACETAMINOPHEN 5; 325 MG/1; MG/1
1 TABLET ORAL EVERY 4 HOURS PRN
Qty: 31 TABLET | Refills: 0 | Status: SHIPPED | OUTPATIENT
Start: 2019-07-17

## 2019-07-17 RX ORDER — ONDANSETRON 2 MG/ML
INJECTION INTRAMUSCULAR; INTRAVENOUS
Status: DISCONTINUED | OUTPATIENT
Start: 2019-07-17 | End: 2019-07-17

## 2019-07-17 RX ORDER — ONDANSETRON 2 MG/ML
4 INJECTION INTRAMUSCULAR; INTRAVENOUS
Status: COMPLETED | OUTPATIENT
Start: 2019-07-17 | End: 2019-07-17

## 2019-07-17 RX ORDER — HYDROCODONE BITARTRATE AND ACETAMINOPHEN 10; 325 MG/1; MG/1
1 TABLET ORAL EVERY 4 HOURS PRN
Status: DISCONTINUED | OUTPATIENT
Start: 2019-07-17 | End: 2019-07-18 | Stop reason: HOSPADM

## 2019-07-17 RX ORDER — LIDOCAINE HYDROCHLORIDE 10 MG/ML
1 INJECTION, SOLUTION EPIDURAL; INFILTRATION; INTRACAUDAL; PERINEURAL ONCE
Status: DISCONTINUED | OUTPATIENT
Start: 2019-07-17 | End: 2019-07-18 | Stop reason: HOSPADM

## 2019-07-17 RX ORDER — DEXTROSE MONOHYDRATE, SODIUM CHLORIDE, AND POTASSIUM CHLORIDE 50; 1.49; 4.5 G/1000ML; G/1000ML; G/1000ML
INJECTION, SOLUTION INTRAVENOUS CONTINUOUS
Status: DISCONTINUED | OUTPATIENT
Start: 2019-07-17 | End: 2019-07-18 | Stop reason: HOSPADM

## 2019-07-17 RX ORDER — KETOROLAC TROMETHAMINE 10 MG/1
10 TABLET, FILM COATED ORAL EVERY 6 HOURS
COMMUNITY

## 2019-07-17 RX ORDER — RAMELTEON 8 MG/1
8 TABLET ORAL NIGHTLY PRN
Status: DISCONTINUED | OUTPATIENT
Start: 2019-07-17 | End: 2019-07-18 | Stop reason: HOSPADM

## 2019-07-17 RX ORDER — HYDROCODONE BITARTRATE AND ACETAMINOPHEN 5; 325 MG/1; MG/1
1 TABLET ORAL EVERY 4 HOURS PRN
Status: DISCONTINUED | OUTPATIENT
Start: 2019-07-17 | End: 2019-07-18 | Stop reason: HOSPADM

## 2019-07-17 RX ORDER — CIPROFLOXACIN 2 MG/ML
400 INJECTION, SOLUTION INTRAVENOUS
Status: DISCONTINUED | OUTPATIENT
Start: 2019-07-17 | End: 2019-07-17

## 2019-07-17 RX ORDER — HYDROMORPHONE HYDROCHLORIDE 1 MG/ML
0.5 INJECTION, SOLUTION INTRAMUSCULAR; INTRAVENOUS; SUBCUTANEOUS
Status: DISCONTINUED | OUTPATIENT
Start: 2019-07-17 | End: 2019-07-17

## 2019-07-17 RX ADMIN — CEFAZOLIN 2 G: 330 INJECTION, POWDER, FOR SOLUTION INTRAMUSCULAR; INTRAVENOUS at 12:07

## 2019-07-17 RX ADMIN — HYDROCODONE BITARTRATE AND ACETAMINOPHEN 1 TABLET: 10; 325 TABLET ORAL at 07:07

## 2019-07-17 RX ADMIN — ONDANSETRON 4 MG: 2 INJECTION INTRAMUSCULAR; INTRAVENOUS at 05:07

## 2019-07-17 RX ADMIN — PROMETHAZINE HYDROCHLORIDE 12.5 MG: 25 INJECTION INTRAMUSCULAR; INTRAVENOUS at 06:07

## 2019-07-17 RX ADMIN — SODIUM CHLORIDE: 0.9 INJECTION, SOLUTION INTRAVENOUS at 10:07

## 2019-07-17 RX ADMIN — SODIUM CHLORIDE, SODIUM GLUCONATE, SODIUM ACETATE, POTASSIUM CHLORIDE, MAGNESIUM CHLORIDE, SODIUM PHOSPHATE, DIBASIC, AND POTASSIUM PHOSPHATE: .53; .5; .37; .037; .03; .012; .00082 INJECTION, SOLUTION INTRAVENOUS at 11:07

## 2019-07-17 RX ADMIN — LIDOCAINE HYDROCHLORIDE 50 MG: 20 INJECTION, SOLUTION INTRAVENOUS at 11:07

## 2019-07-17 RX ADMIN — PROPOFOL 180 MG: 10 INJECTION, EMULSION INTRAVENOUS at 11:07

## 2019-07-17 RX ADMIN — METRONIDAZOLE 500 MG: 500 INJECTION, SOLUTION INTRAVENOUS at 05:07

## 2019-07-17 RX ADMIN — FENTANYL CITRATE 25 MCG: 50 INJECTION, SOLUTION INTRAMUSCULAR; INTRAVENOUS at 02:07

## 2019-07-17 RX ADMIN — HYDROCODONE BITARTRATE AND ACETAMINOPHEN 1 TABLET: 5; 325 TABLET ORAL at 02:07

## 2019-07-17 RX ADMIN — PHENYLEPHRINE HYDROCHLORIDE 100 MCG: 10 INJECTION INTRAVENOUS at 12:07

## 2019-07-17 RX ADMIN — DEXAMETHASONE SODIUM PHOSPHATE 8 MG: 4 INJECTION, SOLUTION INTRAMUSCULAR; INTRAVENOUS at 12:07

## 2019-07-17 RX ADMIN — SUCCINYLCHOLINE CHLORIDE 200 MG: 20 INJECTION, SOLUTION INTRAMUSCULAR; INTRAVENOUS at 11:07

## 2019-07-17 RX ADMIN — MIDAZOLAM HYDROCHLORIDE 2 MG: 1 INJECTION, SOLUTION INTRAMUSCULAR; INTRAVENOUS at 11:07

## 2019-07-17 RX ADMIN — PHENYLEPHRINE HYDROCHLORIDE 100 MCG: 10 INJECTION INTRAVENOUS at 01:07

## 2019-07-17 RX ADMIN — SODIUM CHLORIDE: 0.9 INJECTION, SOLUTION INTRAVENOUS at 11:07

## 2019-07-17 RX ADMIN — HYDROCODONE BITARTRATE AND ACETAMINOPHEN 1 TABLET: 5; 325 TABLET ORAL at 09:07

## 2019-07-17 RX ADMIN — HYDROMORPHONE HYDROCHLORIDE 0.5 MG: 1 INJECTION, SOLUTION INTRAMUSCULAR; INTRAVENOUS; SUBCUTANEOUS at 05:07

## 2019-07-17 RX ADMIN — SUGAMMADEX 385 MG: 100 INJECTION, SOLUTION INTRAVENOUS at 02:07

## 2019-07-17 RX ADMIN — CIPROFLOXACIN 400 MG: 2 INJECTION, SOLUTION INTRAVENOUS at 05:07

## 2019-07-17 RX ADMIN — HYDROMORPHONE HYDROCHLORIDE 0.5 MG: 1 INJECTION, SOLUTION INTRAMUSCULAR; INTRAVENOUS; SUBCUTANEOUS at 04:07

## 2019-07-17 RX ADMIN — ONDANSETRON 4 MG: 2 INJECTION INTRAMUSCULAR; INTRAVENOUS at 01:07

## 2019-07-17 RX ADMIN — PROPOFOL 50 MG: 10 INJECTION, EMULSION INTRAVENOUS at 12:07

## 2019-07-17 RX ADMIN — FAMOTIDINE 20 MG: 10 INJECTION INTRAVENOUS at 04:07

## 2019-07-17 RX ADMIN — FENTANYL CITRATE 100 MCG: 50 INJECTION, SOLUTION INTRAMUSCULAR; INTRAVENOUS at 11:07

## 2019-07-17 RX ADMIN — ROCURONIUM BROMIDE 45 MG: 10 INJECTION, SOLUTION INTRAVENOUS at 12:07

## 2019-07-17 RX ADMIN — ROCURONIUM BROMIDE 5 MG: 10 INJECTION, SOLUTION INTRAVENOUS at 11:07

## 2019-07-17 NOTE — ANESTHESIA PREPROCEDURE EVALUATION
[facility]  Anesthesia Pre-Operative Evaluation         Patient Name: Nicolasa Hernandez  YOB: 1985  MRN: [id number]    SUBJECTIVE:     Pre-operative evaluation for Procedure(s) (LRB):  CHOLECYSTECTOMY, LAPAROSCOPIC (N/A)     07/17/2019    Nicolasa Hernandez is a 34yo female with past medical history of chronic constipation who presents with four days of nausea, vomiting and epigastric abdominal pain.      Patient states that four days ago she developed severe epigastric pain that comes in waves. She threw up 5 times yesterday, clear with bloody streaks per the patient. Had an enema yesterday with BM, history of chronic constipation often going days between BMs. States she doesn't remember the last time she passed gas- usually does not per the patient. Denies fevers, chills, dysuria, hematuria, bloody bowel movements, shortness of breath, chest pain. US in the ED showed cholelithiasis with mild gallbladder wall thickening.     Patient now presents for the above procedure(s).      LDA: None documented.       Peripheral IV - Single Lumen 07/17/19 0406 20 G Right Antecubital (Active)   Site Assessment Clean;Dry;Intact;No redness;No swelling 7/17/2019  4:07 AM   Line Status Blood return noted;Saline locked;Flushed 7/17/2019  4:07 AM   Dressing Status Clean;Dry;Intact 7/17/2019  4:07 AM   Dressing Intervention New dressing 7/17/2019  4:07 AM   Number of days: 0       Prev airway: None documented.    Drips: None documented.   dextrose 5 % and 0.45 % NaCl with KCl 20 mEq         Patient Active Problem List   Diagnosis    Hypertrophy of breast    Ovarian cyst    Constipation    LLQ abdominal pain    Abdominal pain    Cholelithiasis with acute cholecystitis    Cholelithiasis       Review of patient's allergies indicates:   Allergen Reactions    Morphine Swelling       Current Inpatient Medications:   ciprofloxacin (CIPRO)400mg/200ml D5W IVPB  400 mg Intravenous Q12H     HYDROmorphone  0.5 mg Intravenous ED 1 Time    lidocaine (PF) 10 mg/ml (1%)  1 mL Other Once    metronidazole  500 mg Intravenous Q8H       No current facility-administered medications on file prior to encounter.      Current Outpatient Medications on File Prior to Encounter   Medication Sig Dispense Refill    ciprofloxacin HCl (CIPRO) 500 MG tablet Take 500 mg by mouth every 12 (twelve) hours.      ketorolac (TORADOL) 10 mg tablet Take 10 mg by mouth every 6 (six) hours.      ondansetron (ZOFRAN-ODT) 4 MG TbDL Take 1 tablet (4 mg total) by mouth every 6 (six) hours as needed. 15 tablet 0    ibuprofen (ADVIL,MOTRIN) 200 MG tablet Take 2 tablets (400 mg total) by mouth every 6 (six) hours as needed for Pain. 30 tablet 0    ibuprofen (ADVIL,MOTRIN) 800 MG tablet Take 1 tablet (800 mg total) by mouth 3 (three) times daily. 20 tablet 0    mv-min-FA-Xr-Kr-bjgxtep-lutein (CENTRUM) 0.4-162-18 mg Tab Take by mouth.      oxycodone-acetaminophen (PERCOCET) 5-325 mg per tablet Take 1 tablet by mouth every 6 (six) hours as needed for Pain. 20 tablet 0    polyethylene glycol (GLYCOLAX) 17 gram/dose powder          Past Surgical History:   Procedure Laterality Date     SECTION, CLASSIC      x2    HYSTERECTOMY      OTHER SURGICAL HISTORY  tubes/ clipped    TUBAL LIGATION         Social History     Socioeconomic History    Marital status:      Spouse name: Not on file    Number of children: Not on file    Years of education: Not on file    Highest education level: Not on file   Occupational History    Not on file   Social Needs    Financial resource strain: Not on file    Food insecurity:     Worry: Not on file     Inability: Not on file    Transportation needs:     Medical: Not on file     Non-medical: Not on file   Tobacco Use    Smoking status: Never Smoker    Smokeless tobacco: Never Used   Substance and Sexual Activity    Alcohol use: Yes     Comment: occasional    Drug use: No     Sexual activity: Yes     Partners: Male     Birth control/protection: OCP   Lifestyle    Physical activity:     Days per week: Not on file     Minutes per session: Not on file    Stress: Not on file   Relationships    Social connections:     Talks on phone: Not on file     Gets together: Not on file     Attends [employer] service: Not on file     Active member of club or organization: Not on file     Attends meetings of clubs or organizations: Not on file     Relationship status: Not on file   Other Topics Concern    Not on file   Social History Narrative    Not on file       OBJECTIVE:     Vital Signs Range (Last 24H):  Temp:  [36.7 °C (98 °F)-36.8 °C (98.3 °F)]   Pulse:  [68-88]   Resp:  [16-20]   BP: (115-127)/(67-90)   SpO2:  [97 %-100 %]       Significant Labs:  Lab Results   Component Value Date    WBC 6.92 07/17/2019    HGB 10.6 (L) 07/17/2019    HCT 32.8 (L) 07/17/2019     07/17/2019    CHOL 126 03/20/2014    TRIG 96 03/20/2014    HDL 39 (L) 03/20/2014    ALT 41 07/17/2019    AST 50 (H) 07/17/2019     07/17/2019    K 3.5 07/17/2019     07/17/2019    CREATININE 0.9 07/17/2019    BUN 6 07/17/2019    CO2 24 07/17/2019    TSH 0.930 03/20/2014    INR 1.0 06/02/2014       Diagnostic Studies:     Cholelithiasis with mild gallbladder wall thickening and no other ancillary findings to suggest acute cholecystitis.  These findings are equivocal, if there is clinical concern for acute cholecystitis, consider further evaluation with HIDA scan.    Electronically signed by resident: Luis Manrique  Date: 07/17/2019  Time: 04:43    EKG:     Vent. Rate : 091 BPM     Atrial Rate : 091 BPM     P-R Int : 154 ms          QRS Dur : 076 ms      QT Int : 368 ms       P-R-T Axes : 049 039 033 degrees     QTc Int : 452 ms    Normal sinus rhythm  Normal variant, juvenile T waves  Borderline normal ECG or normal variant  When compared with ECG of 02-JUN-2014 13:01,  No significant change was found  Confirmed by  Evelio MALDONADO, Bashir ([id number]) on 7/16/2019 11:09:15 PM    2D ECHO:  No results found for this or any previous visit.      ASSESSMENT/PLAN:                                                                                                                  07/17/2019  Nicolasa Hernandez is a 33 y.o., female.    Anesthesia Evaluation    I have reviewed the Patient Summary Reports.    I have reviewed the Nursing Notes.   I have reviewed the Medications.     Review of Systems  Anesthesia Hx:  No problems with previous Anesthesia Denies Hx of Anesthetic complications  History of prior surgery of interest to airway management or planning: Denies Family Hx of Anesthesia complications.   Denies Personal Hx of Anesthesia complications.   Cardiovascular:  Cardiovascular Normal Exercise tolerance: good     Pulmonary:  Pulmonary Normal    Hepatic/GI:   GERD Cholelithiasis   Neurological:  Neurology Normal        Physical Exam  General:  Well nourished, Morbid Obesity    Airway/Jaw/Neck:  Airway Findings: Mouth Opening: Normal General Airway Assessment: Adult  Mallampati: II  TM Distance: Normal, at least 6 cm  Jaw/Neck Findings:  Neck ROM: Normal ROM  Neck Findings:     Eyes/Ears/Nose:  Eyes/Ears/Nose Findings:    Dental:  Dental Findings: In tact   Chest/Lungs:  Chest/Lungs Findings: Normal Respiratory Rate, Clear to auscultation     Heart/Vascular:  Heart Findings: Rate: Normal  Rhythm: Regular Rhythm        Mental Status:  Mental Status Findings:  Cooperative, Alert and Oriented         Anesthesia Plan  Type of Anesthesia, risks & benefits discussed:  Anesthesia Type:  general  Patient's Preference:   Intra-op Monitoring Plan: standard ASA monitors  Intra-op Monitoring Plan Comments:   Post Op Pain Control Plan: per primary service following discharge from PACU and multimodal analgesia  Post Op Pain Control Plan Comments:   Induction:   IV  Beta Blocker:  Patient is not currently on a Beta-Blocker (No further documentation required).        Informed Consent: Patient understands risks and agrees with Anesthesia plan.  Questions answered. Anesthesia consent signed with patient.  ASA Score: 2     Day of Surgery Review of History & Physical:    H&P update referred to the surgeon.         Ready For Surgery From Anesthesia Perspective.

## 2019-07-17 NOTE — SUBJECTIVE & OBJECTIVE
No current facility-administered medications on file prior to encounter.      Current Outpatient Medications on File Prior to Encounter   Medication Sig    ciprofloxacin HCl (CIPRO) 500 MG tablet Take 500 mg by mouth every 12 (twelve) hours.    ketorolac (TORADOL) 10 mg tablet Take 10 mg by mouth every 6 (six) hours.    ondansetron (ZOFRAN-ODT) 4 MG TbDL Take 1 tablet (4 mg total) by mouth every 6 (six) hours as needed.    ibuprofen (ADVIL,MOTRIN) 200 MG tablet Take 2 tablets (400 mg total) by mouth every 6 (six) hours as needed for Pain.    ibuprofen (ADVIL,MOTRIN) 800 MG tablet Take 1 tablet (800 mg total) by mouth 3 (three) times daily.    mv-min-FA-Jp-Jp-vtnorji-lutein (CENTRUM) 0.4-162-18 mg Tab Take by mouth.    oxycodone-acetaminophen (PERCOCET) 5-325 mg per tablet Take 1 tablet by mouth every 6 (six) hours as needed for Pain.    polyethylene glycol (GLYCOLAX) 17 gram/dose powder        Review of patient's allergies indicates:   Allergen Reactions    Morphine Swelling       Past Medical History:   Diagnosis Date    Endometriosis     Ovarian cyst      Past Surgical History:   Procedure Laterality Date     SECTION, CLASSIC      x2    HYSTERECTOMY      OTHER SURGICAL HISTORY  tubes/ clipped    TUBAL LIGATION       Family History     Problem Relation (Age of Onset)    Cancer Mother, Other    Diabetes Other    Hypertension Other        Tobacco Use    Smoking status: Never Smoker    Smokeless tobacco: Never Used   Substance and Sexual Activity    Alcohol use: Yes     Comment: occasional    Drug use: No    Sexual activity: Yes     Partners: Male     Birth control/protection: OCP     Review of Systems   Constitutional: Negative for chills and fever.   Respiratory: Negative for chest tightness and shortness of breath.    Cardiovascular: Negative for chest pain.   Gastrointestinal: Positive for abdominal pain, constipation, nausea and vomiting. Negative for abdominal distention, blood in  stool and diarrhea.   Genitourinary: Negative for dysuria and hematuria.   Musculoskeletal: Negative for arthralgias.   Skin: Negative for color change.   Neurological: Negative for dizziness and headaches.   Hematological: Negative for adenopathy.   Psychiatric/Behavioral: Negative for agitation.     Objective:     Vital Signs (Most Recent):  Temp: 98.3 °F (36.8 °C) (07/17/19 0245)  Pulse: 88 (07/17/19 0245)  Resp: 20 (07/17/19 0245)  BP: (!) 127/90 (07/17/19 0245)  SpO2: 100 % (07/17/19 0245) Vital Signs (24h Range):  Temp:  [98.3 °F (36.8 °C)] 98.3 °F (36.8 °C)  Pulse:  [88] 88  Resp:  [20] 20  SpO2:  [100 %] 100 %  BP: (127)/(90) 127/90     Weight: 104.3 kg (230 lb)  Body mass index is 37.12 kg/m².    Physical Exam   Constitutional: She is oriented to person, place, and time. She appears well-developed and well-nourished. She appears distressed.   Rocking back and forth stating she is in pain   HENT:   Head: Normocephalic and atraumatic.   Cardiovascular: Normal rate.   Pulmonary/Chest: Effort normal. No respiratory distress.   Abdominal: Soft. She exhibits no distension and no mass. There is tenderness (Tenderness to palpation in RUQ, RLQ). There is no rebound. No hernia.   Well healed incisional scars   Neurological: She is alert and oriented to person, place, and time.   Skin: Skin is warm and dry.   Nursing note and vitals reviewed.      Significant Labs:  CBC:   Recent Labs   Lab 07/17/19 0407   WBC 6.92   RBC 4.20   HGB 10.6*   HCT 32.8*      MCV 78*   MCH 25.2*   MCHC 32.3     CMP:   Recent Labs   Lab 07/17/19 0407   GLU 98   CALCIUM 9.2   ALBUMIN 3.7   PROT 7.8      K 3.5   CO2 24      BUN 6   CREATININE 0.9   ALKPHOS 61   ALT 41   AST 50*   BILITOT 0.5       Significant Diagnostics:  US: gallstones with mild wall thickening

## 2019-07-17 NOTE — OP NOTE
DATE OF PROCEDURE: 07/17/2019.     PREOPERATIVE DIAGNOSIS: Cholelithiasis.     POSTOPERATIVE DIAGNOSIS: Acute cholecystitis.     PROCEDURE PERFORMED: Laparoscopic cholecystectomy.     ATTENDING SURGEON: Raul Pennington MD    RESIDENT: Elizabeth Monzon MD (RES).    ANESTHESIA: General endotracheal.     INDICATIONS: Nicolasa Hernandez is a 33 y.o.female who presented to the emergency department with abdominal pain, nausea, vomiting for the last four days. She was found to have cholelithiasis with mild gallbladder wall thickening on ultrasound. General surgery was consulted for cholelithiasis. The history and exam were consistent with biliary colic, which was confirmed by laboratory studies and ultrasound. We recommended laparoscopic cholecystectomy and the patient agreed to proceed. The patient signed informed consent and expressed understanding of the risks and benefits of surgery.     OPERATIVE PROCEDURE: The patient was taken to the operating room and placed supine. After induction of general endotracheal tube anesthesia, the abdomen was prepped and draped in the standard fashion. Timeout was performed. A right upper quadrant skin incision was made. We then entered the abdomen with the OptiView trocar with direct visualization of all the layers of the abdomen until we were in the intraperitoneal space. A The abdomen was insufflated with carbon dioxide to a maximum pressure of 15 mmHg. A 5-mm laparoscope was placed and the abdomen was examined. There was no evidence of injury from the initial trocar placement. Two 5-mm trocars were placed under direct vision through separate stab incisions, one supraumbilical and one in the lateral right upper quadrant. One 12-mm trocar was placed subxiphoid under direct vision. We then directed our attention to the right upper quadrant. The gallbladder was identified and noted to have  significant inflammatory change. The fundus was grasped and retracted cranially and the  infundibulum was grasped and retracted laterally. We bluntly dissected the peritoneal reflection off the infundibulum and neck of the gallbladder. With careful blunt dissection in this area, we were able to identify the cystic duct. Further careful dissection identified the cystic artery and we did obtain a critical view of safety. Both duct and artery were triply clipped and divided. The gallbladder was dissected off the gallbladder fossa using Bovie electrocautery from infundibulum to fundus until free. It was placed into an EndoCatch bag and removed from the umbilical port site with no difficulty. We reexamined the right upper quadrant. The gallbladder fossa was examined and a scant amount of bleeding was noted from the liver edge on the lateral aspect of the gallbladder fossa. Hemostasis was achieved with Bovie electrocautery. No further bleeding or any bile leak were noted. The clips on the cystic duct and artery were examined and no bleeding or bile leak were noted. The right upper quadrant was irrigated with saline briefly until the returning effluent was clear. All ports were removed under direct vision and no bleeding from any port site was noted. The insufflation of the abdomen was evacuated and the laparoscope and trocar were removed.  All port sites were infiltrated with lidocaine and closed in a subcuticular fashion. Sterile dermabond was applied. The patient was extubated in the Operating Room and transported to the Recovery Room in stable condition. All sponge, instrument and needle counts were correct at the end of the case.     ESTIMATED BLOOD LOSS: 20 mL.     FINDINGS: Cholelithiasis and moderate inflammation.     SPECIMEN: Gallbladder.     DRAINS: None.     COMPLICATIONS: None.     Elizabeth Monzon MD  Pager: [phone]  General Surgery PGY-III  [facility]

## 2019-07-17 NOTE — NURSING
x4 incision cites cdi- dermabonded no drainage noted- abd tender to palpation pt medicated per emar for pain will continue to monitor

## 2019-07-17 NOTE — CONSULTS
Please see H&P for full consult note dated 7/17/19    Elizabeth Monzon MD  Pager: [phone]  General Surgery PGY-III  [facility]

## 2019-07-17 NOTE — ED TRIAGE NOTES
33 year old complaining of severe epigastric pain x a few days that progressively gotten worst. Pt reported not being diagnosed with anything yet. Pt denies nausea and vomiting today but took Zofran tonight with some relief. Denies fever, cp or cough. [place]4.

## 2019-07-17 NOTE — PROVIDER PROGRESS NOTES - EMERGENCY DEPT.
Encounter Date: 7/17/2019    ED Physician Progress Notes         EKG - STEMI Decision  Initial Reading: No STEMI present.    IElvie Covert, am scribing for, and in the presence of, Dr. Hernandez. I performed the above scribed service and the documentation accurately describes the services I performed. I attest to the accuracy of the note.

## 2019-07-17 NOTE — LETTER
07/18/2019                  [address]  [place] [address]  Phone: [phone]  Fax: [phone]   07/18/2019    Patient: Nicolasa Hernandez   YOB: 1985   Date of Visit: 7/17/2019       To Whom it May Concern:    Nicolasa Hernandez was admitted to the hospital for surgical procedure on 7/17/2019. She may return to work on 7/23/19 with limitations of not lifting more than 10lbs for 6 weeks..    If you have any questions or concerns, please don't hesitate to call.    Sincerely,         Elizabeth Monzon MD

## 2019-07-17 NOTE — TRANSFER OF CARE
"Anesthesia Transfer of Care Note    Patient: Nicolasa Hernandez    Procedure(s) Performed: Procedure(s) (LRB):  CHOLECYSTECTOMY, LAPAROSCOPIC (N/A)    Patient location: PACU    Anesthesia Type: general    Transport from OR: Transported from OR on 6-10 L/min O2 by face mask with adequate spontaneous ventilation    Post pain: adequate analgesia    Post assessment: no apparent anesthetic complications    Post vital signs: stable    Level of consciousness: awake    Nausea/Vomiting: no nausea/vomiting    Complications: none    Transfer of care protocol was followed      Last vitals:   Visit Vitals  /83 (BP Location: Right arm, Patient Position: Lying)   Pulse 72   Temp 36.9 °C (98.4 °F) (Temporal)   Resp 20   Ht 5' 6" (1.676 m)   Wt 96.2 kg (212 lb 1.3 oz)   LMP 11/03/2016   SpO2 98%   Breastfeeding? No   BMI 34.23 kg/m²     "

## 2019-07-17 NOTE — H&P
[facility]  General Surgery  History & Physical    Patient Name: Nicolasa Hernandez  MRN: [id number]  Admission Date: 2019  Attending Physician: Jose Manuel Hernandez MD   Primary Care Provider: Primary Doctor No    Patient information was obtained from patient and ER records.     Subjective:     Chief Complaint/Reason for Admission: acute cholecystitis    History of Present Illness: Ms Hernandez is a pleasant 32yo female with past medical history of chronic constipation who presents with four days of nausea, vomiting and epigastric abdominal pain.     Patient states that four days ago she developed severe epigastric pain that comes in waves. Worse with eating. Nothing makes better. She threw up 5 times yesterday, clear with bloody streaks per the patient. Also has been having belching. Went to [facility] two days ago for the same complaint and was discharged with cipro for a UTI, diagnosed with renal stone as well. Represented last night to this ED and was discharged home with pain control. Had an enema yesterday with BM, history of chronic constipation often going days between BMs. States she doesn't remember the last time she passed gas- usually does not per the patient. Denies fevers, chills, dysuria, hematuria, bloody bowel movements, shortness of breath, chest pain. US in the ED showed cholelithiasis with mild gallbladder wall thickening.    PMH: chronic constipation  PSH: lap hysterectomy,  section x2  Allergies: morphine (has swelling, states she has taken oxycodone and hydrocodone with no reaction)  Social: never smoker, social EtOH, denies drug use    No current facility-administered medications on file prior to encounter.      Current Outpatient Medications on File Prior to Encounter   Medication Sig    ciprofloxacin HCl (CIPRO) 500 MG tablet Take 500 mg by mouth every 12 (twelve) hours.    ketorolac (TORADOL) 10 mg tablet Take 10 mg by mouth every 6 (six) hours.    ondansetron  (ZOFRAN-ODT) 4 MG TbDL Take 1 tablet (4 mg total) by mouth every 6 (six) hours as needed.    ibuprofen (ADVIL,MOTRIN) 200 MG tablet Take 2 tablets (400 mg total) by mouth every 6 (six) hours as needed for Pain.    ibuprofen (ADVIL,MOTRIN) 800 MG tablet Take 1 tablet (800 mg total) by mouth 3 (three) times daily.    mv-min-FA-Cr-Ox-lyzfwhu-lutein (CENTRUM) 0.4-162-18 mg Tab Take by mouth.    oxycodone-acetaminophen (PERCOCET) 5-325 mg per tablet Take 1 tablet by mouth every 6 (six) hours as needed for Pain.    polyethylene glycol (GLYCOLAX) 17 gram/dose powder        Review of patient's allergies indicates:   Allergen Reactions    Morphine Swelling       Past Medical History:   Diagnosis Date    Endometriosis     Ovarian cyst      Past Surgical History:   Procedure Laterality Date     SECTION, CLASSIC      x2    HYSTERECTOMY      OTHER SURGICAL HISTORY  tubes/ clipped    TUBAL LIGATION       Family History     Problem Relation (Age of Onset)    Cancer Mother, Other    Diabetes Other    Hypertension Other        Tobacco Use    Smoking status: Never Smoker    Smokeless tobacco: Never Used   Substance and Sexual Activity    Alcohol use: Yes     Comment: occasional    Drug use: No    Sexual activity: Yes     Partners: Male     Birth control/protection: OCP     Review of Systems   Constitutional: Negative for chills and fever.   Respiratory: Negative for chest tightness and shortness of breath.    Cardiovascular: Negative for chest pain.   Gastrointestinal: Positive for abdominal pain, constipation, nausea and vomiting. Negative for abdominal distention, blood in stool and diarrhea.   Genitourinary: Negative for dysuria and hematuria.   Musculoskeletal: Negative for arthralgias.   Skin: Negative for color change.   Neurological: Negative for dizziness and headaches.   Hematological: Negative for adenopathy.   Psychiatric/Behavioral: Negative for agitation.     Objective:     Vital Signs (Most  Recent):  Temp: 98.3 °F (36.8 °C) (07/17/19 0245)  Pulse: 88 (07/17/19 0245)  Resp: 20 (07/17/19 0245)  BP: (!) 127/90 (07/17/19 0245)  SpO2: 100 % (07/17/19 0245) Vital Signs (24h Range):  Temp:  [98.3 °F (36.8 °C)] 98.3 °F (36.8 °C)  Pulse:  [88] 88  Resp:  [20] 20  SpO2:  [100 %] 100 %  BP: (127)/(90) 127/90     Weight: 104.3 kg (230 lb)  Body mass index is 37.12 kg/m².    Physical Exam   Constitutional: She is oriented to person, place, and time. She appears well-developed and well-nourished. She appears distressed.   Rocking back and forth stating she is in pain   HENT:   Head: Normocephalic and atraumatic.   Cardiovascular: Normal rate.   Pulmonary/Chest: Effort normal. No respiratory distress.   Abdominal: Soft. She exhibits no distension and no mass. There is tenderness (Tenderness to palpation in RUQ, RLQ). There is no rebound. No hernia.   Well healed incisional scars   Neurological: She is alert and oriented to person, place, and time.   Skin: Skin is warm and dry.   Nursing note and vitals reviewed.      Significant Labs:  CBC:   Recent Labs   Lab 07/17/19  0407   WBC 6.92   RBC 4.20   HGB 10.6*   HCT 32.8*      MCV 78*   MCH 25.2*   MCHC 32.3     CMP:   Recent Labs   Lab 07/17/19  0407   GLU 98   CALCIUM 9.2   ALBUMIN 3.7   PROT 7.8      K 3.5   CO2 24      BUN 6   CREATININE 0.9   ALKPHOS 61   ALT 41   AST 50*   BILITOT 0.5       Significant Diagnostics:  US: gallstones with mild wall thickening    Assessment/Plan:     * Cholelithiasis with acute cholecystitis  34yo female with cholelithiasis and perhaps early cholecystitis    - admit to general surgery, Dr. Pennington  - GUANAKOO  - cipro/flagyl  - PRN pain meds  - mIVF  - to OR today for lap mikayla, consent obtained at bedside      VTE Risk Mitigation (From admission, onward)        Ordered     Place NARA hose  Until discontinued      07/17/19 0533     Place sequential compression device  Until discontinued      07/17/19 0533     IP VTE HIGH  RISK PATIENT  Once      07/17/19 0533          Elizabeth Monzon MD  General Surgery  [facility]

## 2019-07-17 NOTE — NURSING
Pt back on floor from surgery via w/c- resting quietly vss - pt c'o abd pain with movement  Mother unhappy with how daughter was transported back from surgery- in a w/c not in a stretcher--discussed events with mother- charge nurse notified  Pt medicated for pain - will continue to monitor

## 2019-07-17 NOTE — NURSING
Pt awake- eating chicken noodle melania- tolerated well- family at bedside - nad noted- x4 incision sites cdi

## 2019-07-17 NOTE — ED PROVIDER NOTES
Encounter Date: 2019       History     Chief Complaint   Patient presents with    Abdominal Pain     midsternal burning x 3 days, seen here yesterday with same complaint, denies nausea.      33 year old female with medical history of ovarian cyst, endometriosis presenting to the ED with the chief complaint of abdominal pain. Patient reports intermittent epigastric pain for 4 days. She was evaluated at [facility] ED initially and had a CT scan showing a calculus in her bladder and evidence of a UTI that she was given an RX for Cipro to treat. She was evaluated at [facility] ED yesterday for continued abdominal pain. She had negative laboratory work-up and pain improved with Pepcid and Zofran. She is taking Toradol for pain (took 4 tablets today). She reports tolerating a Turkey sandwich and soup today. She reports surgical history significant for hysterectomy. She denies fever, chest pain, SOB, vomiting, dysuria, hematuria, vaginal bleeding, vaginal discharge, STD concerns, diarrhea, constipation, melena, hematochezia.         Review of patient's allergies indicates:   Allergen Reactions    Morphine Swelling     Past Medical History:   Diagnosis Date    Endometriosis     Ovarian cyst      Past Surgical History:   Procedure Laterality Date     SECTION, CLASSIC      x2    HYSTERECTOMY      OTHER SURGICAL HISTORY  tubes/ clipped    TUBAL LIGATION       Family History   Problem Relation Age of Onset    Cancer Mother         breast cancer?    Hypertension Other     Cancer Other     Diabetes Other      Social History     Tobacco Use    Smoking status: Never Smoker    Smokeless tobacco: Never Used   Substance Use Topics    Alcohol use: Yes     Comment: occasional    Drug use: No     Review of Systems   Constitutional: Negative for chills, diaphoresis and fever.   HENT: Negative for congestion, sore throat and trouble swallowing.    Eyes: Negative for pain and redness.   Respiratory: Negative for cough and  shortness of breath.    Cardiovascular: Negative for chest pain.   Gastrointestinal: Positive for abdominal pain and nausea. Negative for constipation, diarrhea and vomiting.   Genitourinary: Negative for dysuria and hematuria.   Musculoskeletal: Negative for arthralgias, back pain, neck pain and neck stiffness.   Skin: Negative for rash and wound.   Neurological: Negative for light-headedness and headaches.     Physical Exam     Initial Vitals [07/17/19 0245]   BP Pulse Resp Temp SpO2   (!) 127/90 88 20 98.3 °F (36.8 °C) 100 %      MAP       --         Physical Exam    Constitutional: She appears well-developed and well-nourished. She is not diaphoretic.   Obese female   HENT:   Head: Normocephalic and atraumatic.   Mouth/Throat: Oropharynx is clear and moist. No oropharyngeal exudate.   Eyes: EOM are normal. Pupils are equal, round, and reactive to light.   Neck: Normal range of motion. Neck supple.   Cardiovascular: Normal rate and regular rhythm.   Pulmonary/Chest: Breath sounds normal. No respiratory distress. She has no wheezes.   Abdominal: There is tenderness (Epigastric).   Positive Villalba's sign  No CVA tenderness   Musculoskeletal: Normal range of motion. She exhibits no edema or tenderness.   No midline spinal tenderness   Neurological: She is alert and oriented to person, place, and time. She has normal strength. No cranial nerve deficit or sensory deficit.   Skin: Skin is warm and dry. No erythema.       ED Course   Procedures  Labs Reviewed   CBC W/ AUTO DIFFERENTIAL - Abnormal; Notable for the following components:       Result Value    Hemoglobin 10.6 (*)     Hematocrit 32.8 (*)     Mean Corpuscular Volume 78 (*)     Mean Corpuscular Hemoglobin 25.2 (*)     All other components within normal limits   COMPREHENSIVE METABOLIC PANEL - Abnormal; Notable for the following components:    AST 50 (*)     All other components within normal limits   LIPASE   MAGNESIUM          Imaging Results           US  Abdomen Limited (Gallbladder) (Final result)  Result time 07/17/19 04:50:18    Final result by Spencer Rodriguez MD (07/17/19 04:50:18)                 Impression:      Cholelithiasis with mild gallbladder wall thickening and no other ancillary findings to suggest acute cholecystitis.  These findings are equivocal, if there is clinical concern for acute cholecystitis, consider further evaluation with HIDA scan.    This report was flagged in Epic as abnormal.    Electronically signed by resident: Luis Manrique  Date:    07/17/2019  Time:    04:43    Electronically signed by: Spencer Rodriguez MD  Date:    07/17/2019  Time:    04:50             Narrative:    EXAMINATION:  US ABDOMEN LIMITED    CLINICAL HISTORY:  Epigastric pain    TECHNIQUE:  Limited ultrasound of the right upper quadrant of the abdomen focused on the biliary system was performed.    COMPARISON:  CT abdomen pelvis 04/14/2014    FINDINGS:  Gallbladder: Multiple gallstones are seen, largest measuring 2.3 cm.  Mild wall thickening.  There is no pericholecystic fluid.  No sonographic Villalba's sign.  Nonspecific abdominal tenderness, mainly in the midline abdomen.    Biliary system: The common duct is not dilated, measuring 2 mm.  No intrahepatic ductal dilatation.    Pancreas: The visualized portions of pancreas appear normal.    Miscellaneous: No upper abdominal ascites and no abnormalities of the visualized liver.                                 Medical Decision Making:   History:   Old Medical Records: I decided to obtain old medical records.  Old Records Summarized: records from clinic visits.  Clinical Tests:   Lab Tests: Ordered and Reviewed  Radiological Study: Ordered and Reviewed  Medical Tests: Ordered and Reviewed  Other:   I have discussed this case with another health care provider.       <> Summary of the Discussion: General Surgery       APC / Resident Notes:   33 year old female with medical history of ovarian cyst, endometriosis  presenting to the ED c/o 4 days of intermittent epigastric pain. DDx includes but not limited to PUD, cholecystitis, symptomatic cholelithiasis, pancreatitis, viral gastroenteritis, nephrolithiasis, UTI. Will check labs and U/S gallbladder. Will give Pepcid and Zofran for symptoms. She reports vomiting with GI cocktail previously.     Work-up shows WBC 6.9, H/H 10.6/32.8, Plt 280, Crt 0.9, Tbil 0.5, Alk phos 61, AST/ALT 50/41, AG 10, Lipase 16, Mg 1.8  U/S gallbladder shows cholelithiasis with mild gallbladder wall thickening    Discussed patient with GS who evaluated patient at bedside. They will admit to their service for ongoing management. Patient expresses understanding and agreeable to the plan. I have discussed the care of this patient with my supervising physician.                  Clinical Impression:       ICD-10-CM ICD-9-CM   1. Calculus of gallbladder with acute cholecystitis without obstruction K80.00 574.00   2. Abdominal pain R10.9 789.00   3. Epigastric pain R10.13 789.06   4. Cholelithiasis K80.20 574.20         Disposition:   Disposition: Placed in Observation  Condition: Brad Bhandari PA-C  07/17/19 0766

## 2019-07-17 NOTE — ASSESSMENT & PLAN NOTE
32yo female with cholelithiasis and perhaps early cholecystitis    - admit to general surgery, Dr. Pennington  - GUANAKOO  - cipro/flagyl  - PRN pain meds  - mIVF  - to OR today for lap mikayla, consent obtained at bedside

## 2019-07-17 NOTE — BRIEF OP NOTE
[facility]  Brief Operative Note    SUMMARY     Surgery Date: 7/17/2019     Surgeon(s) and Role:     * Raul Pennington MD - Primary     * Elizabeth Monzon MD - Resident - Assisting    Pre-op Diagnosis:  Calculus of gallbladder with acute cholecystitis without obstruction [K80.00]    Post-op Diagnosis:  Post-Op Diagnosis Codes:     * Calculus of gallbladder with acute cholecystitis without obstruction [K80.00]    Procedure(s) (LRB):  CHOLECYSTECTOMY, LAPAROSCOPIC (N/A)    Anesthesia: Choice    Description of Procedure: Laparoscopic cholecystectomy.    Description of the findings of the procedure: Inflamed, edematous gallbladder. Laparoscopic cholecystectomy without complication.    Estimated Blood Loss: 20mL         Specimens:   Specimen (12h ago, onward)    Start     Ordered    07/17/19 1305  Specimen to Pathology - Surgery  Once     Comments:  Pre-op Diagnosis: Calculus of gallbladder with acute cholecystitis without obstruction [K80.00]Procedure(s):CHOLECYSTECTOMY, LAPAROSCOPicNumber of specimens: 1Name of specimens: 1. Gallbladder - perm     Start Status     07/17/19 1305 Collected (07/17/19 1345) Order ID: [id number]       07/17/19 1339        Elizabeth Monzon MD  Pager: [phone]  General Surgery PGY-III  [facility]

## 2019-07-17 NOTE — PLAN OF CARE
Unable to complete discharge planning assessment due to patient is not in room. Having surgery today.     [facility] left at BS.       07/17/19 2104   Discharge Assessment   Assessment Type Discharge Planning Assessment

## 2019-07-17 NOTE — HPI
Ms Hernandez is a pleasant 32yo female with past medical history of chronic constipation who presents with four days of nausea, vomiting and epigastric abdominal pain.     Patient states that four days ago she developed severe epigastric pain that comes in waves. Worse with eating. Nothing makes better. She threw up 5 times yesterday, clear with bloody streaks per the patient. Also has been having belching. Went to [facility] two days ago for the same complaint and was discharged with cipro for a UTI, diagnosed with renal stone as well. Represented last night to this ED and was discharged home with pain control. Had an enema yesterday with BM, history of chronic constipation often going days between BMs. States she doesn't remember the last time she passed gas- usually does not per the patient. US in the ED showed cholelithiasis with mild gallbladder wall thickening.    PMH: chronic constipation  PSH: lap hysterectomy,  section x2  Allergies: morphine (has swelling, states she has taken oxycodone and hydrocodone with no reaction)  Social: never smoker, social EtOH, denies drug use

## 2019-07-18 VITALS
WEIGHT: 212.06 LBS | SYSTOLIC BLOOD PRESSURE: 122 MMHG | BODY MASS INDEX: 34.08 KG/M2 | OXYGEN SATURATION: 100 % | HEIGHT: 66 IN | RESPIRATION RATE: 16 BRPM | HEART RATE: 67 BPM | TEMPERATURE: 98 F | DIASTOLIC BLOOD PRESSURE: 70 MMHG

## 2019-07-18 LAB
ALBUMIN SERPL BCP-MCNC: 3.6 G/DL (ref 3.5–5.2)
ALP SERPL-CCNC: 59 U/L (ref 55–135)
ALT SERPL W/O P-5'-P-CCNC: 46 U/L (ref 10–44)
ANION GAP SERPL CALC-SCNC: 10 MMOL/L (ref 8–16)
AST SERPL-CCNC: 41 U/L (ref 10–40)
BASOPHILS # BLD AUTO: 0.02 K/UL (ref 0–0.2)
BASOPHILS NFR BLD: 0.2 % (ref 0–1.9)
BILIRUB SERPL-MCNC: 0.6 MG/DL (ref 0.1–1)
BUN SERPL-MCNC: 5 MG/DL (ref 6–20)
CALCIUM SERPL-MCNC: 9.6 MG/DL (ref 8.7–10.5)
CHLORIDE SERPL-SCNC: 105 MMOL/L (ref 95–110)
CO2 SERPL-SCNC: 25 MMOL/L (ref 23–29)
CREAT SERPL-MCNC: 0.8 MG/DL (ref 0.5–1.4)
DIFFERENTIAL METHOD: ABNORMAL
EOSINOPHIL # BLD AUTO: 0 K/UL (ref 0–0.5)
EOSINOPHIL NFR BLD: 0.1 % (ref 0–8)
ERYTHROCYTE [DISTWIDTH] IN BLOOD BY AUTOMATED COUNT: 13.8 % (ref 11.5–14.5)
EST. GFR  (AFRICAN AMERICAN): >60 ML/MIN/1.73 M^2
EST. GFR  (NON AFRICAN AMERICAN): >60 ML/MIN/1.73 M^2
GLUCOSE SERPL-MCNC: 97 MG/DL (ref 70–110)
HCT VFR BLD AUTO: 33.8 % (ref 37–48.5)
HGB BLD-MCNC: 10.7 G/DL (ref 12–16)
IMM GRANULOCYTES # BLD AUTO: 0.04 K/UL (ref 0–0.04)
IMM GRANULOCYTES NFR BLD AUTO: 0.4 % (ref 0–0.5)
LYMPHOCYTES # BLD AUTO: 2.1 K/UL (ref 1–4.8)
LYMPHOCYTES NFR BLD: 18.7 % (ref 18–48)
MCH RBC QN AUTO: 25.5 PG (ref 27–31)
MCHC RBC AUTO-ENTMCNC: 31.7 G/DL (ref 32–36)
MCV RBC AUTO: 81 FL (ref 82–98)
MONOCYTES # BLD AUTO: 0.8 K/UL (ref 0.3–1)
MONOCYTES NFR BLD: 6.8 % (ref 4–15)
NEUTROPHILS # BLD AUTO: 8.2 K/UL (ref 1.8–7.7)
NEUTROPHILS NFR BLD: 73.8 % (ref 38–73)
NRBC BLD-RTO: 0 /100 WBC
PLATELET # BLD AUTO: 314 K/UL (ref 150–350)
PMV BLD AUTO: 10.9 FL (ref 9.2–12.9)
POTASSIUM SERPL-SCNC: 4 MMOL/L (ref 3.5–5.1)
PROT SERPL-MCNC: 7.7 G/DL (ref 6–8.4)
RBC # BLD AUTO: 4.2 M/UL (ref 4–5.4)
SODIUM SERPL-SCNC: 140 MMOL/L (ref 136–145)
WBC # BLD AUTO: 11.08 K/UL (ref 3.9–12.7)

## 2019-07-18 PROCEDURE — 85025 COMPLETE CBC W/AUTO DIFF WBC: CPT

## 2019-07-18 PROCEDURE — G0378 HOSPITAL OBSERVATION PER HR: HCPCS

## 2019-07-18 PROCEDURE — 25000003 PHARM REV CODE 250: Performed by: STUDENT IN AN ORGANIZED HEALTH CARE EDUCATION/TRAINING PROGRAM

## 2019-07-18 PROCEDURE — 94761 N-INVAS EAR/PLS OXIMETRY MLT: CPT

## 2019-07-18 PROCEDURE — 63600175 PHARM REV CODE 636 W HCPCS: Performed by: STUDENT IN AN ORGANIZED HEALTH CARE EDUCATION/TRAINING PROGRAM

## 2019-07-18 PROCEDURE — 80053 COMPREHEN METABOLIC PANEL: CPT

## 2019-07-18 PROCEDURE — 36415 COLL VENOUS BLD VENIPUNCTURE: CPT

## 2019-07-18 RX ADMIN — HYDROCODONE BITARTRATE AND ACETAMINOPHEN 1 TABLET: 10; 325 TABLET ORAL at 04:07

## 2019-07-18 RX ADMIN — HYDROMORPHONE HYDROCHLORIDE 0.5 MG: 1 INJECTION, SOLUTION INTRAMUSCULAR; INTRAVENOUS; SUBCUTANEOUS at 06:07

## 2019-07-18 RX ADMIN — HYDROMORPHONE HYDROCHLORIDE 0.5 MG: 1 INJECTION, SOLUTION INTRAMUSCULAR; INTRAVENOUS; SUBCUTANEOUS at 12:07

## 2019-07-18 RX ADMIN — HYDROCODONE BITARTRATE AND ACETAMINOPHEN 1 TABLET: 5; 325 TABLET ORAL at 09:07

## 2019-07-18 NOTE — PLAN OF CARE
Problem: Adult Inpatient Plan of Care  Goal: Plan of Care Review  Outcome: Ongoing (interventions implemented as appropriate)  Reviewed POC with patient, she verbalizes understanding. VSS. Pain controlled with PRN medications. No acute events/falls this shift. Call bell within reach. Will continue to monitor.

## 2019-07-18 NOTE — DISCHARGE SUMMARY
[facility]  General Surgery  Discharge Summary      Patient Name: Nicolasa Hernandez  MRN: [id number]  Admission Date: 2019  Hospital Length of Stay: 0 days  Discharge Date and Time:  2019 6:56 AM  Attending Physician: Raul Pennington MD   Discharging Provider: Elizabeth Monzon MD  Primary Care Provider: Primary Doctor No     HPI: Ms Hernandez is a pleasant 32yo female with past medical history of chronic constipation who presents with four days of nausea, vomiting and epigastric abdominal pain.      Patient states that four days ago she developed severe epigastric pain that comes in waves. Worse with eating. Nothing makes better. She threw up 5 times yesterday, clear with bloody streaks per the patient. Also has been having belching. Went to [facility] two days ago for the same complaint and was discharged with cipro for a UTI, diagnosed with renal stone as well. Represented last night to this ED and was discharged home with pain control. Had an enema yesterday with BM, history of chronic constipation often going days between BMs. States she doesn't remember the last time she passed gas- usually does not per the patient. Denies fevers, chills, dysuria, hematuria, bloody bowel movements, shortness of breath, chest pain. US in the ED showed cholelithiasis with mild gallbladder wall thickening.     PMH: chronic constipation  PSH: lap hysterectomy,  section x2  Allergies: morphine (has swelling, states she has taken oxycodone and hydrocodone with no reaction)  Social: never smoker, social EtOH, denies drug use    Procedure(s) (LRB):  CHOLECYSTECTOMY, LAPAROSCOPIC (N/A)     Hospital Course: Nicolasa Hernandez underwent the above procedure on 19 as treatment for acute cholecystitis. She tolerated the procedure well and her post-op course was uncomplicated. Prior to discharge home on 2019 her pain was well controlled on oral medications, tolerated diet, ambulated and  spontaneously voided. She was discharged home in good condition on POD#1.      Consults:   Consults (From admission, onward)        Status Ordering Provider     Inpatient consult to General surgery  Once     Provider:  (Not yet assigned)    [facility]          Significant Diagnostic Studies: Labs:   CMP   Recent Labs   Lab 07/17/19  0407      K 3.5      CO2 24   GLU 98   BUN 6   CREATININE 0.9   CALCIUM 9.2   PROT 7.8   ALBUMIN 3.7   BILITOT 0.5   ALKPHOS 61   AST 50*   ALT 41   ANIONGAP 10   ESTGFRAFRICA >60.0   EGFRNONAA >60.0    and CBC   Recent Labs   Lab 07/17/19 0407 07/18/19  0456   WBC 6.92 11.08   HGB 10.6* 10.7*   HCT 32.8* 33.8*    314     Physical exam:  Physical Exam   Constitutional: She is oriented to person, place, and time and well-developed, well-nourished, and in no distress.   HENT:   Head: Normocephalic and atraumatic.   Cardiovascular: Normal rate and regular rhythm.   Pulmonary/Chest: Effort normal. No respiratory distress.   Abdominal: She exhibits no distension. There is tenderness (Appropriate incisional tenderness).   Incisions clean, dry, intact.   Neurological: She is alert and oriented to person, place, and time.   Skin: Skin is warm and dry.         Pending Diagnostic Studies:     Procedure Component Value Units Date/Time    Comprehensive metabolic panel [[id number]] Collected:  07/18/19 0456    Order Status:  Sent Lab Status:  In process Updated:  07/18/19 0614    Specimen:  Blood         Final Active Diagnoses:    Diagnosis Date Noted POA    PRINCIPAL PROBLEM:  Cholelithiasis with acute cholecystitis [K80.00] 07/17/2019 Yes    Abdominal pain [R10.9] 04/11/2014 Yes      Problems Resolved During this Admission:    Diagnosis Date Noted Date Resolved POA    Cholelithiasis [K80.20] 07/17/2019 07/17/2019 Yes      Discharged Condition: good    Disposition: Home or Self Care    Follow Up:  Follow-up Information     Raul Pennington MD In 2 weeks.     Specialty:  General Surgery  Why:  Post-op-Office to arrange & notify you.  Contact information:  [address]  [facility] [id number] [phone]                 Patient Instructions:      Diet Adult Regular     Lifting restrictions   Order Comments: Do not lift anything heavier than 10lbs for six weeks.     No driving until:   Order Comments: Do not drive while taking pain medications.     Notify your health care provider if you experience any of the following:  temperature >100.4     Notify your health care provider if you experience any of the following:  persistent nausea and vomiting or diarrhea     Notify your health care provider if you experience any of the following:  severe uncontrolled pain     Notify your health care provider if you experience any of the following:  redness, tenderness, or signs of infection (pain, swelling, redness, odor or green/yellow discharge around incision site)     No dressing needed   Order Comments: Incisions covered with dermabond (superglue), no dressing needed. Can keep covered with gauze as needed to protect clothing.     Activity as tolerated     Shower on day dressing removed (No bath)   Order Comments: Shower after 48 hours after surgery, do not submerge incisions for 6 weeks.     Medications:  Reconciled Home Medications:      Medication List      START taking these medications    HYDROcodone-acetaminophen 5-325 mg per tablet  Commonly known as:  NORCO  Take 1 tablet by mouth every 4 (four) hours as needed for Pain (Do not drive while taking pain medications).        CONTINUE taking these medications    CENTRUM 0.4-162-18 mg Tab  Generic drug:  mv-min-FA-Fj-Ek-iiseywo-lutein  Take by mouth.     ciprofloxacin HCl 500 MG tablet  Commonly known as:  CIPRO  Take 500 mg by mouth every 12 (twelve) hours.     * ibuprofen 800 MG tablet  Commonly known as:  ADVIL,MOTRIN  Take 1 tablet (800 mg total) by mouth 3 (three) times daily.     * ibuprofen 200 MG tablet  Commonly  known as:  ADVIL,MOTRIN  Take 2 tablets (400 mg total) by mouth every 6 (six) hours as needed for Pain.     ketorolac 10 mg tablet  Commonly known as:  TORADOL  Take 10 mg by mouth every 6 (six) hours.     ondansetron 4 MG Tbdl  Commonly known as:  ZOFRAN-ODT  Take 1 tablet (4 mg total) by mouth every 6 (six) hours as needed.     oxyCODONE-acetaminophen 5-325 mg per tablet  Commonly known as:  PERCOCET  Take 1 tablet by mouth every 6 (six) hours as needed for Pain.     polyethylene glycol 17 gram/dose powder  Commonly known as:  GLYCOLAX         * This list has 2 medication(s) that are the same as other medications prescribed for you. Read the directions carefully, and ask your doctor or other care provider to review them with you.                Elizabeth Monzon MD  General Surgery  [facility]

## 2019-07-18 NOTE — PLAN OF CARE
Problem: Adult Inpatient Plan of Care  Goal: Plan of Care Review  Outcome: Ongoing (interventions implemented as appropriate)  Pt with no falls or injuries this hospital stay. Pt reports pain level 3/10 post pain medication. Pt afebrile, surgical sites benign. No s/s infection. Pt educated on post op s/s of infection.

## 2019-07-18 NOTE — PLAN OF CARE
07/18/19 1125   Final Note   Assessment Type Final Discharge Note   Anticipated Discharge Disposition Home   What phone number can be called within the next 1-3 days to see how you are doing after discharge?   ([phone])   Hospital Follow Up  Appt(s) scheduled? Yes   Discharge plans and expectations educations in teach back method with documentation complete? Yes   Right Care Referral Info   Post Acute Recommendation No Care

## 2019-07-18 NOTE — ANESTHESIA POSTPROCEDURE EVALUATION
Anesthesia Post Evaluation    Patient: Nicolasa Hernandez    Procedure(s) Performed: Procedure(s) (LRB):  CHOLECYSTECTOMY, LAPAROSCOPIC (N/A)    Final Anesthesia Type: general  Patient location during evaluation: PACU  Patient participation: Yes- Able to Participate  Level of consciousness: awake and alert  Post-procedure vital signs: reviewed and stable  Pain management: adequate  Airway patency: patent  PONV status at discharge: No PONV  Anesthetic complications: no      Cardiovascular status: blood pressure returned to baseline  Respiratory status: unassisted  Hydration status: euvolemic  Follow-up not needed.          Vitals Value Taken Time   /74 7/18/2019  7:26 AM   Temp 36.7 °C (98 °F) 7/18/2019  7:26 AM   Pulse 68 7/18/2019  7:26 AM   Resp 17 7/18/2019  7:26 AM   SpO2 98 % 7/18/2019  7:26 AM         No case tracking events are documented in the log.      Pain/Mega Score: Pain Rating Prior to Med Admin: 9 (7/18/2019  6:33 AM)  Pain Rating Post Med Admin: 3 (7/17/2019  3:10 PM)  Mega Score: 9 (7/17/2019  2:10 PM)

## 2019-07-18 NOTE — PLAN OF CARE
Patient lives in a elevated 1 story house w/13 TOM, w/railings, w/her family. Her mother is at her BS. Patient is independent & agile. No needs are determined.     [facility] PCP: Dr. Chacko.       07/18/19 1120   Discharge Assessment   Assessment Type Discharge Planning Assessment   Confirmed/corrected address and phone number on facesheet? Yes   Assessment information obtained from? Patient;Medical Record   Expected Length of Stay (days)   (1)   Communicated expected length of stay with patient/caregiver yes   Prior to hospitilization cognitive status: Alert/Oriented;No Deficits   Prior to hospitalization functional status: Independent   Current cognitive status: Alert/Oriented;No Deficits   Current Functional Status: Independent   Facility Arrived From:   (N/A)   Lives With child(shauna), dependent;parent(s)  (2 kids, Father)   Able to Return to Prior Arrangements yes   Is patient able to care for self after discharge? Yes   Who are your caregiver(s) and their phone number(s)?   (Araceli Montiel Mother     [phone]   )   Patient's perception of discharge disposition home or selfcare;other (comments)  (Mother's [facility]: Address: [address] [place] [address] , [place] [id number])   Readmission Within the Last 30 Days no previous admission in last 30 days   Patient currently being followed by outpatient case management? No   Patient currently receives any other outside agency services? No   Equipment Currently Used at Home none   Do you have any problems affording any of your prescribed medications? No   Is the patient taking medications as prescribed? yes   Does the patient have transportation home? Yes   Transportation Anticipated family or friend will provide   Dialysis Name and Scheduled days   (N/A)   Does the patient receive services at the [facility]? No   Discharge Plan A Home with family   Discharge Plan B Home with family   DME Needed Upon Discharge  none   Patient/Family in Agreement with Plan yes

## 2019-07-18 NOTE — NURSING
Order to d/c home today. Saline lock removed. VSS. Discharge instructions given to pt. Pt verbalized understanding. Pt discharged via w/c. Pt accompanied by mother. All personal belongings taken home by pt

## (undated) DEVICE — WARMER DRAPE STERILE LF

## (undated) DEVICE — BLADE SURG CARBON STEEL SZ11

## (undated) DEVICE — TROCAR ENDOPATH XCEL 5X75MM

## (undated) DEVICE — DRAPE ABDOMINAL TIBURON 14X11

## (undated) DEVICE — TROCAR ENDOPATH XCEL 5MM 7.5CM

## (undated) DEVICE — SEE MEDLINE ITEM 152622

## (undated) DEVICE — SUT MCRYL PLUS 4-0 PS2 27IN

## (undated) DEVICE — ADHESIVE DERMABOND ADVANCED

## (undated) DEVICE — ELECTRODE REM PLYHSV RETURN 9

## (undated) DEVICE — NDL HYPO REG 25G X 1 1/2

## (undated) DEVICE — KIT ANTIFOG

## (undated) DEVICE — BAG TISS RETRV MONARCH 10MM

## (undated) DEVICE — TRAY MINOR GEN SURG

## (undated) DEVICE — DRAPE STERI INSTRUMENT 1018

## (undated) DEVICE — IRRIGATOR ENDOSCOPY DISP.

## (undated) DEVICE — SCISSOR 5MMX35CM DIRECT DRIVE

## (undated) DEVICE — SOL NS 1000CC

## (undated) DEVICE — CLIP HEMO-LOK ML

## (undated) DEVICE — TUBING HF INSUFFLATION W/ FLTR

## (undated) DEVICE — NDL 18GA X1 1/2 REG BEVEL